# Patient Record
Sex: MALE | Race: WHITE | NOT HISPANIC OR LATINO | Employment: FULL TIME | URBAN - METROPOLITAN AREA
[De-identification: names, ages, dates, MRNs, and addresses within clinical notes are randomized per-mention and may not be internally consistent; named-entity substitution may affect disease eponyms.]

---

## 2017-06-22 ENCOUNTER — OFFICE VISIT (OUTPATIENT)
Dept: FAMILY MEDICINE | Facility: CLINIC | Age: 37
End: 2017-06-22
Payer: MEDICAID

## 2017-06-22 VITALS
SYSTOLIC BLOOD PRESSURE: 120 MMHG | BODY MASS INDEX: 26.33 KG/M2 | WEIGHT: 173.2 LBS | DIASTOLIC BLOOD PRESSURE: 80 MMHG | OXYGEN SATURATION: 96 % | TEMPERATURE: 99.1 F | HEART RATE: 78 BPM

## 2017-06-22 DIAGNOSIS — M77.00 GOLFER'S ELBOW, UNSPECIFIED LATERALITY: Primary | ICD-10-CM

## 2017-06-22 DIAGNOSIS — D17.1 BENIGN LIPOMATOUS NEOPLASM OF SKIN AND SUBCUTANEOUS TISSUE OF TRUNK: ICD-10-CM

## 2017-06-22 PROCEDURE — 99213 OFFICE O/P EST LOW 20 MIN: CPT | Performed by: INTERNAL MEDICINE

## 2017-06-22 ASSESSMENT — PAIN SCALES - GENERAL: PAINLEVEL: MILD PAIN (3)

## 2017-06-22 NOTE — MR AVS SNAPSHOT
After Visit Summary   6/22/2017    Rd Rodarte    MRN: [id number]           Patient Information     Date Of Birth          1980        Visit Information        Provider Department      6/22/2017 5:30 PM Sebastian Nj MD [facility]        Today's Diagnoses     Golfer's elbow, unspecified laterality    -  1    Benign lipomatous neoplasm of skin and subcutaneous tissue of trunk          Care Instructions    - Try a Counterforce brace and ice for elbow pain.    - Let me know if it gets any worse.          Follow-ups after your visit        Who to contact     If you have questions or need follow up information about today's clinic visit or your schedule please contact [facility] directly at [phone].  Normal or non-critical lab and imaging results will be communicated to you by [employer], letter or phone within 4 business days after the clinic has received the results. If you do not hear from us within 7 days, please contact the clinic through [employer] or phone. If you have a critical or abnormal lab result, we will notify you by phone as soon as possible.  Submit refill requests through "[employer]" or call your pharmacy and they will forward the refill request to us. Please allow 3 business days for your refill to be completed.          Additional Information About Your Visit        MyChart Information     "[employer]" gives you secure access to your electronic health record. If you see a primary care provider, you can also send messages to your care team and make appointments. If you have questions, please call your primary care clinic.  If you do not have a primary care provider, please call [phone] and they will assist you.        Care EveryWhere ID     This is your Care EveryWhere ID. This could be used by other organizations to access your [facility] medical records  [id number]        Your Vitals Were     Pulse Temperature Pulse Oximetry BMI (Body Mass Index)           78 99.1  F (37.3  C) (Oral) 96% 26.33 kg/m2         Blood Pressure from Last 3 Encounters:   06/22/17 120/80   01/12/15 124/78   08/28/14 122/88    Weight from Last 3 Encounters:   06/22/17 173 lb 3.2 oz (78.6 kg)   01/12/15 169 lb 12.8 oz (77 kg)   10/01/14 165 lb (74.8 kg)              Today, you had the following     No orders found for display       Primary Care Provider Office Phone # Fax #    Sebastian Nj [phone]       [address] [address]        Equal Access to Services     MOSES STORY : Hadii ricardo ku hadasho Soomaali, waaxda luqadaha, qaybta kaalmada adeegyada, waxselin barrera . So [facility] [phone].    ATENCIÓN: Si habla español, tiene a guzman disposición servicios gratuitos de asistencia lingüística. Llame al [phone].    We comply with applicable federal civil rights laws and [place] laws. We do not discriminate on the basis of race, color, national origin, age, disability sex, sexual orientation or gender identity.            Thank you!     Thank you for choosing [facility]  for your care. Our goal is always to provide you with excellent care. Hearing back from our patients is one way we can continue to improve our services. Please take a few minutes to complete the written survey that you may receive in the mail after your visit with us. Thank you!             Your Updated Medication List - Protect others around you: Learn how to safely use, store and throw away your medicines at www.disposemymeds.org.          This list is accurate as of: 6/22/17 11:17 PM.  Always use your most recent med list.                   Brand Name Dispense Instructions for use Diagnosis    COUMADIN 7.5 MG tablet   Generic drug:  warfarin      Take 1 tablet by mouth daily.        MULTIVITAMIN PO

## 2017-06-22 NOTE — PROGRESS NOTES
SUBJECTIVE:                                                    Rd Rodarte is a 36 year old male who presents to clinic today for the following health issues:       Golfer's elbow, unspecified laterality  Benign lipomatous neoplasm of skin and subcutaneous tissue of trunk     Mass -- Patient states his mass on his right elbow. He notes it has gotten smaller since he stopped using free weights although he's not certain of this. His elbows also have been mildly painful, about 2-3/10. He has family history of rheumatoid arthritis. He reports previously his bump was twice as big as now and and was somewhat erythematous.     Problem list and histories reviewed & adjusted, as indicated.  Additional history: as documented    Patient Active Problem List   Diagnosis     History of aortic valve repair in 1984, AVR in 1989 and 2nd AVR in 2012     Smoking     Chronic anticoagulation     Prophylactic antibiotic     Chest wall pain     Congestive heart failure (H)     CARDIOVASCULAR SCREENING; LDL GOAL LESS THAN 100     Anisocoria     Embolic stroke involving right cerebral artery, 1998     Closed Colles' fracture of left radius with malunion     Past Surgical History:   Procedure Laterality Date     REDO STERNOTOMY REPAIR VALVE AORTIC  1989    age of 8, had a medtronic-Patel 23 mm valve     REDO STERNOTOMY REPLACE VALVE AORTIC  12/27/2011    at [facility], 3rd time redo sternotomy     REPAIR VALVE AORTIC CHILD  1984    age of 3, first surgery for congenital bicuspid aortic valve     WRIST SURGERY  june 1999    ORIF of a fracture       Social History   Substance Use Topics     Smoking status: Former Smoker     Quit date: 12/1/2011     Smokeless tobacco: Never Used     Alcohol use Yes     Family History   Problem Relation Age of Onset     HEART DISEASE Father      2008     HEART DISEASE Maternal Grandfather          Current Outpatient Prescriptions   Medication Sig Dispense Refill     Multiple Vitamins-Minerals  (MULTIVITAMIN OR)        warfarin (COUMADIN) 7.5 MG tablet Take 1 tablet by mouth daily.       Labs reviewed in EPIC    Reviewed and updated as needed this visit by clinical staff  Tobacco  Allergies  Meds  Med Hx  Surg Hx  Fam Hx  Soc Hx      Reviewed and updated as needed this visit by Provider         ROS:  Constitutional, HEENT, cardiovascular, pulmonary, GI, , musculoskeletal, neuro, skin, endocrine and psych systems are negative, except as otherwise noted.    This document serves as a record of the services and decisions personally performed and made by Sebastian Nj MD. It was created on their behalf by Kerwin Zuniga, a trained medical scribe. The creation of this document is based the provider's statements to the medical scribe.  Kerwin Zuniga June 22, 2017 6:02 PM    OBJECTIVE:                                                    /80 (BP Location: Right arm, Cuff Size: Adult Regular)  Pulse 78  Temp 99.1  F (37.3  C) (Oral)  Wt 78.6 kg (173 lb 3.2 oz)  SpO2 96%  BMI 26.33 kg/m2  Body mass index is 26.33 kg/(m^2).  GENERAL: healthy, alert and no distress, talkative and appropriate   EYES: Eyes grossly normal to inspection, PERRL and conjunctivae and sclerae normal  ABDOMEN: soft, nontender, no hepatosplenomegaly, no masses and bowel sounds normal  MS: fleshy soft mass on right elbow, nothing bigger then an apricot pit, without tenderness to palpation it's along the extensor surface. Normal ROM of bilateral elbows, medial epicondyl tenderness, no lateral epicondyl tenderness  SKIN: no suspicious lesions or rashes to visible skin  NEURO: mentation intact and speech normal  PSYCH: mentation appears normal, affect normal/bright     ASSESSMENT/PLAN:                                                      (M77.00) Alex's elbow, unspecified laterality  (primary encounter diagnosis)  Comment: this is an entirely non-worrisome clinical presentation . His elbow soreness is totally consistent with  medial epicondylitis   Plan: we reviewed this in significant detail  With recommended to use a counter-force release brace and non-steroidal antiinflammatory drugs / ice / and review of stretching exercises . This is generally a self limited condition     (D17.1) Benign lipomatous neoplasm of skin and subcutaneous tissue of trunk  Comment: despite his history and the way he presents this, the exam is completely benign and no evidence whatsoever of rheumatoid nodules , gout occupational therapy tophi or subcutaneous cysts , this has all the earmarks of a lipoma  Plan: diagnosis explained , We discussed what to be on the watch for. No actions necessary at this point unless significant changes were to take place     We briefly reviewed other healthcare and patient is not interested to pursue other issues today . Follow up complete physical exam / general checkup is recommended within nothing more then 6 months     Patient Instructions   - Try a Counterforce brace and ice for elbow pain.    - Let me know if it gets any worse.    The information in this document, created by the medical scribe for me, accurately reflects the services I personally performed and the decisions made by me. I have reviewed and approved this document for accuracy.   MD Sebastian Shaver MD  [facility]

## 2017-06-22 NOTE — NURSING NOTE
"Chief Complaint   Patient presents with     Mass     bump on right elbow that grows in size after lifting weights x6 weeks - family history of arthritis.        Initial /80 (BP Location: Right arm, Cuff Size: Adult Regular)  Pulse 78  Temp 99.1  F (37.3  C) (Oral)  Wt 173 lb 3.2 oz (78.6 kg)  SpO2 96%  BMI 26.33 kg/m2 Estimated body mass index is 26.33 kg/(m^2) as calculated from the following:    Height as of 1/12/15: 5' 8\" (1.727 m).    Weight as of this encounter: 173 lb 3.2 oz (78.6 kg).  Medication Reconciliation: complete       Vicky Menezes CMA      "

## 2017-07-14 ENCOUNTER — TELEPHONE (OUTPATIENT)
Dept: FAMILY MEDICINE | Facility: CLINIC | Age: 37
End: 2017-07-14

## 2017-07-14 DIAGNOSIS — Z79.2 PROPHYLACTIC ANTIBIOTIC: Primary | ICD-10-CM

## 2017-07-14 RX ORDER — AMOXICILLIN 500 MG/1
2000 CAPSULE ORAL ONCE
Qty: 4 CAPSULE | Refills: 3 | Status: SHIPPED | OUTPATIENT
Start: 2017-07-14 | End: 2017-07-14

## 2017-07-14 NOTE — TELEPHONE ENCOUNTER
Reason for call:  Other   Patient called regarding (reason for call): appointment  Additional comments: Pt has dental appointment and needs antibiotic for appointment/contact for information.    Phone number to reach patient:  Cell number on file:    Telephone Information:   Mobile [phone]       Best Time:  anytime    Can we leave a detailed message on this number?  YES

## 2017-07-14 NOTE — TELEPHONE ENCOUNTER
Prescription generated and signed . Please ask patient if he is ok with us removing his penicillin allergy from allergy section of the Epic electronic medical records since he's taken penicillin without troubles since his purported reaction when he was 4 years old     Sebastian Nj MD

## 2017-07-14 NOTE — TELEPHONE ENCOUNTER
Major Sultana got the information of his preferred Pharmacy.   [id number] - [place], [place] - [address] AT [facility] [place]  [address]  [place] [address]  Phone: [phone] Fax: [phone]

## 2017-07-17 NOTE — TELEPHONE ENCOUNTER
Spoke with patient.  He stated that he has been fine taking amoxicillin but had a reaction when he was little and on PCN/amoxicillin for an extended period.  He does not want PCN/amoxicillin removed from his allergy list yet.  He will call back after trying the amoxicillin again tomorrow for his dental procedure    Candy De Leon RN

## 2018-11-14 ENCOUNTER — TRANSFERRED RECORDS (OUTPATIENT)
Dept: HEALTH INFORMATION MANAGEMENT | Facility: CLINIC | Age: 38
End: 2018-11-14

## 2020-02-16 ENCOUNTER — HEALTH MAINTENANCE LETTER (OUTPATIENT)
Age: 40
End: 2020-02-16

## 2020-09-01 ENCOUNTER — OFFICE VISIT (OUTPATIENT)
Dept: INTERNAL MEDICINE | Facility: CLINIC | Age: 40
End: 2020-09-01
Payer: COMMERCIAL

## 2020-09-01 VITALS
SYSTOLIC BLOOD PRESSURE: 106 MMHG | DIASTOLIC BLOOD PRESSURE: 74 MMHG | WEIGHT: 161 LBS | HEART RATE: 75 BPM | OXYGEN SATURATION: 99 % | RESPIRATION RATE: 16 BRPM | BODY MASS INDEX: 24.48 KG/M2 | TEMPERATURE: 97.9 F

## 2020-09-01 DIAGNOSIS — M54.12 CERVICAL RADICULOPATHY: ICD-10-CM

## 2020-09-01 DIAGNOSIS — Z86.79 HISTORY OF AORTIC VALVE REPAIR: ICD-10-CM

## 2020-09-01 DIAGNOSIS — Z13.29 SCREENING FOR HYPOTHYROIDISM: ICD-10-CM

## 2020-09-01 DIAGNOSIS — Z83.719 FAMILY HISTORY OF COLONIC POLYPS: ICD-10-CM

## 2020-09-01 DIAGNOSIS — Z00.00 ROUTINE HISTORY AND PHYSICAL EXAMINATION OF ADULT: Primary | ICD-10-CM

## 2020-09-01 DIAGNOSIS — Z23 NEED FOR PROPHYLACTIC VACCINATION AND INOCULATION AGAINST INFLUENZA: ICD-10-CM

## 2020-09-01 DIAGNOSIS — Z98.890 HISTORY OF AORTIC VALVE REPAIR: ICD-10-CM

## 2020-09-01 DIAGNOSIS — Z11.4 ENCOUNTER FOR SCREENING FOR HIV: ICD-10-CM

## 2020-09-01 DIAGNOSIS — I63.40: ICD-10-CM

## 2020-09-01 DIAGNOSIS — Z13.6 CARDIOVASCULAR SCREENING; LDL GOAL LESS THAN 100: ICD-10-CM

## 2020-09-01 LAB
ALT SERPL W P-5'-P-CCNC: 27 U/L (ref 0–70)
ANION GAP SERPL CALCULATED.3IONS-SCNC: 7 MMOL/L (ref 3–14)
BASOPHILS # BLD AUTO: 0 10E9/L (ref 0–0.2)
BASOPHILS NFR BLD AUTO: 0.2 %
BUN SERPL-MCNC: 12 MG/DL (ref 7–30)
CALCIUM SERPL-MCNC: 8.3 MG/DL (ref 8.5–10.1)
CHLORIDE SERPL-SCNC: 110 MMOL/L (ref 94–109)
CHOLEST SERPL-MCNC: 222 MG/DL
CO2 SERPL-SCNC: 24 MMOL/L (ref 20–32)
CREAT SERPL-MCNC: 0.74 MG/DL (ref 0.66–1.25)
DIFFERENTIAL METHOD BLD: NORMAL
EOSINOPHIL # BLD AUTO: 0.2 10E9/L (ref 0–0.7)
EOSINOPHIL NFR BLD AUTO: 4.6 %
ERYTHROCYTE [DISTWIDTH] IN BLOOD BY AUTOMATED COUNT: 13 % (ref 10–15)
GFR SERPL CREATININE-BSD FRML MDRD: >90 ML/MIN/{1.73_M2}
GLUCOSE SERPL-MCNC: 102 MG/DL (ref 70–99)
HCT VFR BLD AUTO: 46 % (ref 40–53)
HDLC SERPL-MCNC: 72 MG/DL
HGB BLD-MCNC: 15.6 G/DL (ref 13.3–17.7)
HIV 1+2 AB+HIV1 P24 AG SERPL QL IA: NONREACTIVE
LDLC SERPL CALC-MCNC: 137 MG/DL
LYMPHOCYTES # BLD AUTO: 1.1 10E9/L (ref 0.8–5.3)
LYMPHOCYTES NFR BLD AUTO: 23.7 %
MCH RBC QN AUTO: 31.5 PG (ref 26.5–33)
MCHC RBC AUTO-ENTMCNC: 33.9 G/DL (ref 31.5–36.5)
MCV RBC AUTO: 93 FL (ref 78–100)
MONOCYTES # BLD AUTO: 0.6 10E9/L (ref 0–1.3)
MONOCYTES NFR BLD AUTO: 12.1 %
NEUTROPHILS # BLD AUTO: 2.9 10E9/L (ref 1.6–8.3)
NEUTROPHILS NFR BLD AUTO: 59.4 %
NONHDLC SERPL-MCNC: 150 MG/DL
PLATELET # BLD AUTO: 164 10E9/L (ref 150–450)
POTASSIUM SERPL-SCNC: 3.8 MMOL/L (ref 3.4–5.3)
RBC # BLD AUTO: 4.96 10E12/L (ref 4.4–5.9)
SODIUM SERPL-SCNC: 141 MMOL/L (ref 133–144)
TRIGL SERPL-MCNC: 66 MG/DL
TSH SERPL DL<=0.005 MIU/L-ACNC: 1.14 MU/L (ref 0.4–4)
WBC # BLD AUTO: 4.8 10E9/L (ref 4–11)

## 2020-09-01 PROCEDURE — 90686 IIV4 VACC NO PRSV 0.5 ML IM: CPT | Performed by: INTERNAL MEDICINE

## 2020-09-01 PROCEDURE — 80061 LIPID PANEL: CPT | Performed by: INTERNAL MEDICINE

## 2020-09-01 PROCEDURE — 99213 OFFICE O/P EST LOW 20 MIN: CPT | Mod: 25 | Performed by: INTERNAL MEDICINE

## 2020-09-01 PROCEDURE — 80048 BASIC METABOLIC PNL TOTAL CA: CPT | Performed by: INTERNAL MEDICINE

## 2020-09-01 PROCEDURE — 84460 ALANINE AMINO (ALT) (SGPT): CPT | Performed by: INTERNAL MEDICINE

## 2020-09-01 PROCEDURE — 36415 COLL VENOUS BLD VENIPUNCTURE: CPT | Performed by: INTERNAL MEDICINE

## 2020-09-01 PROCEDURE — 99386 PREV VISIT NEW AGE 40-64: CPT | Mod: 25 | Performed by: INTERNAL MEDICINE

## 2020-09-01 PROCEDURE — 84443 ASSAY THYROID STIM HORMONE: CPT | Performed by: INTERNAL MEDICINE

## 2020-09-01 PROCEDURE — 87389 HIV-1 AG W/HIV-1&-2 AB AG IA: CPT | Performed by: INTERNAL MEDICINE

## 2020-09-01 PROCEDURE — 85025 COMPLETE CBC W/AUTO DIFF WBC: CPT | Performed by: INTERNAL MEDICINE

## 2020-09-01 PROCEDURE — 90471 IMMUNIZATION ADMIN: CPT | Performed by: INTERNAL MEDICINE

## 2020-09-01 ASSESSMENT — ENCOUNTER SYMPTOMS: NUMBNESS: 1

## 2020-09-01 NOTE — PATIENT INSTRUCTIONS
We reviewed multiple issues and concerns today     1. A colonoscopy is ordered because you have a family history of colon polyps in someone aged in their young 40's  2. A cervical spine MRI study is ordered due to your bilateral arm / hand symptoms which are suggestive of a pinched nerve in your neck. Because this is potentially serious  This study is recommended . Further follow up depending on the test results   3. You will get your repeat echocardiogram scheduled down the road, please let me know if you need any help getting this set up.  4. We ordered multiple laboratory studies , I will follow up with you with your results as soon as all of these tests are received     Sebastian Nj MD

## 2020-09-01 NOTE — LETTER
September 3, 2020      Rd Rodarte  [address]  [place] [address]        Dear ,    We are writing to inform you of your test results.    All of these tests are within acceptable limits , things look good !         Resulted Orders   TSH with free T4 reflex   Result Value Ref Range    TSH 1.14 0.40 - 4.00 mU/L   ALT   Result Value Ref Range    ALT 27 0 - 70 U/L   Basic metabolic panel   Result Value Ref Range    Sodium 141 133 - 144 mmol/L    Potassium 3.8 3.4 - 5.3 mmol/L    Chloride 110 (H) 94 - 109 mmol/L    Carbon Dioxide 24 20 - 32 mmol/L    Anion Gap 7 3 - 14 mmol/L    Glucose 102 (H) 70 - 99 mg/dL      Comment:      Fasting specimen    Urea Nitrogen 12 7 - 30 mg/dL    Creatinine 0.74 0.66 - 1.25 mg/dL    GFR Estimate >90 >60 mL/min/[1.73_m2]      Comment:      Non  GFR Calc  Starting 12/18/2018, serum creatinine based estimated GFR (eGFR) will be   calculated using the Chronic Kidney Disease Epidemiology Collaboration   (CKD-EPI) equation.      GFR Estimate If Black >90 >60 mL/min/[1.73_m2]      Comment:       GFR Calc  Starting 12/18/2018, serum creatinine based estimated GFR (eGFR) will be   calculated using the Chronic Kidney Disease Epidemiology Collaboration   (CKD-EPI) equation.      Calcium 8.3 (L) 8.5 - 10.1 mg/dL   CBC with platelets differential   Result Value Ref Range    WBC 4.8 4.0 - 11.0 10e9/L    RBC Count 4.96 4.4 - 5.9 10e12/L    Hemoglobin 15.6 13.3 - 17.7 g/dL    Hematocrit 46.0 40.0 - 53.0 %    MCV 93 78 - 100 fl    MCH 31.5 26.5 - 33.0 pg    MCHC 33.9 31.5 - 36.5 g/dL    RDW 13.0 10.0 - 15.0 %    Platelet Count 164 150 - 450 10e9/L    % Neutrophils 59.4 %    % Lymphocytes 23.7 %    % Monocytes 12.1 %    % Eosinophils 4.6 %    % Basophils 0.2 %    Absolute Neutrophil 2.9 1.6 - 8.3 10e9/L    Absolute Lymphocytes 1.1 0.8 - 5.3 10e9/L    Absolute Monocytes 0.6 0.0 - 1.3 10e9/L    Absolute Eosinophils 0.2 0.0 - 0.7 10e9/L     Absolute Basophils 0.0 0.0 - 0.2 10e9/L    Diff Method Automated Method    Lipid panel reflex to direct LDL Fasting   Result Value Ref Range    Cholesterol 222 (H) <200 mg/dL      Comment:      Desirable:       <200 mg/dl    Triglycerides 66 <150 mg/dL      Comment:      Fasting specimen    HDL Cholesterol 72 >39 mg/dL    LDL Cholesterol Calculated 137 (H) <100 mg/dL      Comment:      Above desirable:  100-129 mg/dl  Borderline High:  130-159 mg/dL  High:             160-189 mg/dL  Very high:       >189 mg/dl      Non HDL Cholesterol 150 (H) <130 mg/dL      Comment:      Above Desirable:  130-159 mg/dl  Borderline high:  160-189 mg/dl  High:             190-219 mg/dl  Very high:       >219 mg/dl     HIV Antigen Antibody Combo   Result Value Ref Range    HIV Antigen Antibody Combo Nonreactive NR^Nonreactive          Comment:      HIV-1 p24 Ag & HIV-1/HIV-2 Ab Not Detected       If you have any questions or concerns, please call the clinic at the number listed above.       Sincerely,        Sebastian Nj MD/aura

## 2020-09-08 ENCOUNTER — TELEPHONE (OUTPATIENT)
Dept: FAMILY MEDICINE | Facility: CLINIC | Age: 40
End: 2020-09-08

## 2020-09-08 DIAGNOSIS — F40.240 CLAUSTROPHOBIA: Primary | ICD-10-CM

## 2020-09-08 RX ORDER — LORAZEPAM 0.5 MG/1
0.5 TABLET ORAL ONCE
Qty: 1 TABLET | Refills: 0 | Status: SHIPPED | OUTPATIENT
Start: 2020-09-08 | End: 2020-09-08

## 2020-09-08 NOTE — TELEPHONE ENCOUNTER
Patient is having anxiety about MRI scheduled tomorrow and would like something to take prior to the MRI.   Alee Starr RN

## 2020-09-08 NOTE — TELEPHONE ENCOUNTER
Reason for Call:  Other medication     Detailed comments: Patient wants to know if he can get  a sensitive medication due to anxiety. Patient has a MRI scheduled for tomorrow 9/9/20 at 820AM.        Phone Number Patient can be reached at: Cell number on file:    Telephone Information:   Mobile [phone]       Best Time: any    Can we leave a detailed message on this number? YES    Call taken on 9/8/2020 at 11:37 AM by Mundo Wolfe

## 2020-09-09 ENCOUNTER — ANCILLARY PROCEDURE (OUTPATIENT)
Dept: MRI IMAGING | Facility: CLINIC | Age: 40
End: 2020-09-09
Attending: INTERNAL MEDICINE
Payer: COMMERCIAL

## 2020-09-09 DIAGNOSIS — M54.12 CERVICAL RADICULOPATHY: ICD-10-CM

## 2020-09-09 PROCEDURE — 72141 MRI NECK SPINE W/O DYE: CPT | Mod: TC

## 2020-09-25 ENCOUNTER — OFFICE VISIT (OUTPATIENT)
Dept: URGENT CARE | Facility: URGENT CARE | Age: 40
End: 2020-09-25
Payer: COMMERCIAL

## 2020-09-25 VITALS
HEART RATE: 78 BPM | TEMPERATURE: 98.1 F | SYSTOLIC BLOOD PRESSURE: 112 MMHG | DIASTOLIC BLOOD PRESSURE: 70 MMHG | OXYGEN SATURATION: 98 %

## 2020-09-25 DIAGNOSIS — H10.9 BACTERIAL CONJUNCTIVITIS OF RIGHT EYE: ICD-10-CM

## 2020-09-25 DIAGNOSIS — H66.001 NON-RECURRENT ACUTE SUPPURATIVE OTITIS MEDIA OF RIGHT EAR WITHOUT SPONTANEOUS RUPTURE OF TYMPANIC MEMBRANE: Primary | ICD-10-CM

## 2020-09-25 PROCEDURE — 99214 OFFICE O/P EST MOD 30 MIN: CPT | Performed by: NURSE PRACTITIONER

## 2020-09-25 RX ORDER — POLYMYXIN B SULFATE AND TRIMETHOPRIM 1; 10000 MG/ML; [USP'U]/ML
1-2 SOLUTION OPHTHALMIC EVERY 4 HOURS
Qty: 5 ML | Refills: 0 | Status: SHIPPED | OUTPATIENT
Start: 2020-09-25 | End: 2020-10-02

## 2020-09-25 RX ORDER — CEFDINIR 300 MG/1
300 CAPSULE ORAL 2 TIMES DAILY
Qty: 20 CAPSULE | Refills: 0 | Status: SHIPPED | OUTPATIENT
Start: 2020-09-25 | End: 2020-10-05

## 2020-09-25 RX ORDER — DOXYCYCLINE HYCLATE 100 MG
100 TABLET ORAL 2 TIMES DAILY
Qty: 14 TABLET | Refills: 0 | Status: CANCELLED | OUTPATIENT
Start: 2020-09-25 | End: 2020-10-02

## 2020-09-25 ASSESSMENT — ENCOUNTER SYMPTOMS
SHORTNESS OF BREATH: 0
FEVER: 0
EYE PAIN: 1
NAUSEA: 0
VOMITING: 0
CHILLS: 0
RHINORRHEA: 0
DIARRHEA: 0
SORE THROAT: 0
COUGH: 0
EYE DISCHARGE: 1
EYE REDNESS: 1
DIAPHORESIS: 0

## 2020-09-25 NOTE — PATIENT INSTRUCTIONS
Patient Education     Bacterial Conjunctivitis    You have an infection in the membranes covering the white part of the eye. This part of the eye is called the conjunctiva. The infection is called conjunctivitis. The most common symptoms of conjunctivitis include a thick, pus-like discharge from the eye, swollen eyelids, redness, eyelids sticking together upon awakening, and a gritty or scratchy feeling in the eye. Your infection was caused by bacteria. It may be treated with medicine. With treatment, the infection takes about 7 to 10 days to resolve.  Home care    Use prescribed antibiotic eye drops or ointment as directed to treat the infection.    Apply a warm compress (towel soaked in warm water) to the affected eye 3 to 4 times a day. Do this just before applying medicine to the eye.    Use a warm, wet cloth to wipe away crusting of the eyelids in the morning. This is caused by mucus drainage during the night. You may also use saline irrigating solution or artificial tears to rinse away mucus in the eye. Do not put a patch over the eye.    Wash your hands before and after touching the infected eye. This is to prevent spreading the infection to the other eye, and to other people. Don't share your towels or washcloths with others.    You may use acetaminophen or ibuprofen to control pain, unless another medicine was prescribed. (Note: If you have chronic liver or kidney disease or have ever had a stomach ulcer or gastrointestinal bleeding, talk with your doctor before using these medicines.)    Don't wear contact lenses until your eyes have healed and all symptoms are gone.  Follow-up care  Follow up with your healthcare provider, or as advised.  When to seek medical advice  Call your healthcare provider right away if any of these occur:    Worsening vision    Increasing pain in the eye    Increasing swelling or redness of the eyelid    Redness spreading around the eye  Date Last Reviewed: 7/1/2017 2000-2019  [employer]. [address] [place] [address], [place] [address]. All rights reserved. This information is not intended as a substitute for professional medical care. Always follow your healthcare professional's instructions.           Patient Education     Middle Ear Infection (Otitis Media) in Adults  What is a middle ear infection?  A middle ear infection occurs behind the eardrum. It is most often caused by a virus or bacteria. Most kids have at least one middle ear infection by the time they are 3 years old. But adults can also get them.  What causes middle ear infections?  Inflammation in the middle ear most often starts after you ve had a sore throat, cold, or other upper respiratory problem. The infection spreads to the middle ear and causes fluid buildup behind the eardrum.   What are the symptoms of a middle ear infection?  These are the most common symptoms of middle ear infections in adults:    Ear pain    Feeling of fullness in the hear    Fluid draining from the ear    Fever    Hearing loss  These symptoms may look like other conditions or health problems. Always talk with your healthcare provider for a diagnosis.  How is a middle ear infection diagnosed?  Your healthcare provider will review your health history and do a physical exam. He or she will check the outer ear and the eardrum using an otoscope. The otoscope is a lighted tool that lets the healthcare provider see inside the ear. A pneumatic otoscope blows a puff of air into the ear to test eardrum movement. When there is fluid or infection in the middle ear, movement is decreased.  Your provider may also do a tympanometry. This is a test that directs air and sound to the middle ear.  If you have ear infections often, your healthcare provider may suggest having a hearing test.  How is a middle ear infection treated?  Treatment will depend on your symptoms, age, and general health. It will also depend on how severe the condition  is.  Treatment may include:    Antibiotics    Pain relievers    Placing small tubes in the eardrum for chronic ear infections   What are possible complications of a middle ear infection?  Untreated ear infections can lead to:    Infection in other parts of the head    Lasting (permanent) hearing loss    Speech and language problems  Can middle ear infections be prevented?  Cold and allergy medicines don't seem to prevent ear infections. And currently there is no vaccine that can prevent the disease. But check with your healthcare provider and make sure your vaccines are up-to-date. Living in a home where cigarettes are smoked can increase the chances of ear infections.  Key points about middle ear infections    Middle ear infections can affect both children and adults.    Pain and fever can be the most common symptoms.    Without treatment, permanent hearing loss may happen.    Take antibiotics as prescribed and finish all of the prescription. This can help prevent antibiotic-resistant infections or incomplete treatment with the infection returning.    Next steps  Tips to help you get the most from a visit to your healthcare provider:    Know the reason for your visit and what you want to happen.    Before your visit, write down questions you want answered.    Bring someone with you to help you ask questions and remember what your provider tells you.    At the visit, write down the name of a new diagnosis, and any new medicines, treatments, or tests. Also write down any new instructions your provider gives you.    Know why a new medicine or treatment is prescribed, and how it will help you. Also know what the side effects are.    Ask if your condition can be treated in other ways.    Know why a test or procedure is recommended and what the results could mean.    Know what to expect if you do not take the medicine or have the test or procedure.    If you have a follow-up appointment, write down the date, time, and  purpose for that visit.    Know how you can contact your provider if you have questions.      8260-5223 [employer]. [address] [facility], [place] [address]. All rights reserved. This information is not intended as a substitute for professional medical care. Always follow your healthcare professional's instructions.

## 2020-09-25 NOTE — PROGRESS NOTES
SUBJECTIVE:   Rd Rodarte is a 40 year old male presenting with a chief complaint of   Chief Complaint   Patient presents with     Nasal Congestion     bilateral ear pain, nasal congestion and right eye mattering for the past week       He is an established patient of [facility].    Both ear pain    Onset of symptoms was 7 day(s) ago.  Course of illness is worsening.    Severity moderate  Current and Associated symptoms: ear pain bilateral  Treatment measures tried include None tried.  Predisposing factors include None.    Eye Problem    Onset of symptoms was 2 day(s) ago.   Location: right eye   Course of illness is worsening.    Severity moderate  Current and Associated symptoms: discharge, mattering, pain, redness  Treatment measures tried include none  Context: none    Review of Systems   Constitutional: Negative for chills, diaphoresis and fever.   HENT: Positive for ear pain. Negative for congestion, rhinorrhea and sore throat.    Eyes: Positive for pain, discharge and redness.   Respiratory: Negative for cough and shortness of breath.    Gastrointestinal: Negative for diarrhea, nausea and vomiting.   All other systems reviewed and are negative.      Past Medical History:   Diagnosis Date     Chest wall pain      Chronic anticoagulation      Congestive heart failure, unspecified 2011    heart attack-      History of aortic valve repair      Myocardial infarction (H)     of left ventricle, due to ruptured / failed AVR     Prophylactic antibiotic      Smoking      Unspecified cerebral artery occlusion with cerebral infarction Feb 1998    this was an embolic event associated with LUE and Left Hand weakness     Family History   Problem Relation Age of Onset     Heart Disease Father         2008     Heart Disease Maternal Grandfather      Current Outpatient Medications   Medication Sig Dispense Refill     cefdinir (OMNICEF) 300 MG capsule Take 1 capsule (300 mg) by mouth 2 times daily for 10 days 20 capsule 0      Multiple Vitamins-Minerals (MULTIVITAMIN OR)        trimethoprim-polymyxin b (POLYTRIM) 01593-2.1 UNIT/ML-% ophthalmic solution Place 1-2 drops into the right eye every 4 hours for 7 days 5 mL 0     warfarin (COUMADIN) 7.5 MG tablet Take 1 tablet by mouth daily.       Social History     Tobacco Use     Smoking status: Former Smoker     Last attempt to quit: 2011     Years since quittin.8     Smokeless tobacco: Never Used   Substance Use Topics     Alcohol use: Yes       OBJECTIVE  /70   Pulse 78   Temp 98.1  F (36.7  C) (Tympanic)   SpO2 98%     Physical Exam  Vitals signs and nursing note reviewed.   Constitutional:       General: He is not in acute distress.     Appearance: He is well-developed. He is not diaphoretic.   HENT:      Head: Normocephalic and atraumatic.      Right Ear: External ear normal. Tympanic membrane is erythematous and bulging.      Left Ear: Tympanic membrane and external ear normal.   Eyes:      General:         Right eye: Discharge (yellow) present.      Conjunctiva/sclera:      Right eye: Right conjunctiva is injected.      Pupils: Pupils are equal, round, and reactive to light.   Neck:      Musculoskeletal: Normal range of motion and neck supple.   Pulmonary:      Effort: Pulmonary effort is normal. No respiratory distress.      Breath sounds: Normal breath sounds.   Lymphadenopathy:      Cervical: No cervical adenopathy.   Skin:     General: Skin is warm and dry.   Neurological:      Mental Status: He is alert.      Cranial Nerves: No cranial nerve deficit.           ASSESSMENT:      ICD-10-CM    1. Non-recurrent acute suppurative otitis media of right ear without spontaneous rupture of tympanic membrane  H66.001 cefdinir (OMNICEF) 300 MG capsule   2. Bacterial conjunctivitis of right eye  H10.9 trimethoprim-polymyxin b (POLYTRIM) 14271-2.1 UNIT/ML-% ophthalmic solution        Medical Decision Making:    Differential Diagnosis:    Serious Comorbid Conditions:  Adult:   Anticoagulation    PLAN:  Antibiotics as prescribed.  Recheck ear in 2 weeks.  Advised patient to use warm compresses to keep the eyelids clean. To keep the bacteria from spreading, wash  hands often. Use a separate tissue to wipe each eye. Don t touch eyes or share bedding or towels.  Patient educational/instructional material provided including reasons for follow-up    The patient indicates understanding of these issues and agrees with the plan.          Patient Instructions     Patient Education     Bacterial Conjunctivitis    You have an infection in the membranes covering the white part of the eye. This part of the eye is called the conjunctiva. The infection is called conjunctivitis. The most common symptoms of conjunctivitis include a thick, pus-like discharge from the eye, swollen eyelids, redness, eyelids sticking together upon awakening, and a gritty or scratchy feeling in the eye. Your infection was caused by bacteria. It may be treated with medicine. With treatment, the infection takes about 7 to 10 days to resolve.  Home care    Use prescribed antibiotic eye drops or ointment as directed to treat the infection.    Apply a warm compress (towel soaked in warm water) to the affected eye 3 to 4 times a day. Do this just before applying medicine to the eye.    Use a warm, wet cloth to wipe away crusting of the eyelids in the morning. This is caused by mucus drainage during the night. You may also use saline irrigating solution or artificial tears to rinse away mucus in the eye. Do not put a patch over the eye.    Wash your hands before and after touching the infected eye. This is to prevent spreading the infection to the other eye, and to other people. Don't share your towels or washcloths with others.    You may use acetaminophen or ibuprofen to control pain, unless another medicine was prescribed. (Note: If you have chronic liver or kidney disease or have ever had a stomach ulcer or gastrointestinal  bleeding, talk with your doctor before using these medicines.)    Don't wear contact lenses until your eyes have healed and all symptoms are gone.  Follow-up care  Follow up with your healthcare provider, or as advised.  When to seek medical advice  Call your healthcare provider right away if any of these occur:    Worsening vision    Increasing pain in the eye    Increasing swelling or redness of the eyelid    Redness spreading around the eye  Date Last Reviewed: 7/1/2017 2000-2019 [employer]. [address] [place] [address]. All rights reserved. This information is not intended as a substitute for professional medical care. Always follow your healthcare professional's instructions.           Patient Education     Middle Ear Infection (Otitis Media) in Adults  What is a middle ear infection?  A middle ear infection occurs behind the eardrum. It is most often caused by a virus or bacteria. Most kids have at least one middle ear infection by the time they are 3 years old. But adults can also get them.  What causes middle ear infections?  Inflammation in the middle ear most often starts after you ve had a sore throat, cold, or other upper respiratory problem. The infection spreads to the middle ear and causes fluid buildup behind the eardrum.   What are the symptoms of a middle ear infection?  These are the most common symptoms of middle ear infections in adults:    Ear pain    Feeling of fullness in the hear    Fluid draining from the ear    Fever    Hearing loss  These symptoms may look like other conditions or health problems. Always talk with your healthcare provider for a diagnosis.  How is a middle ear infection diagnosed?  Your healthcare provider will review your health history and do a physical exam. He or she will check the outer ear and the eardrum using an otoscope. The otoscope is a lighted tool that lets the healthcare provider see inside the ear. A pneumatic otoscope blows a  puff of air into the ear to test eardrum movement. When there is fluid or infection in the middle ear, movement is decreased.  Your provider may also do a tympanometry. This is a test that directs air and sound to the middle ear.  If you have ear infections often, your healthcare provider may suggest having a hearing test.  How is a middle ear infection treated?  Treatment will depend on your symptoms, age, and general health. It will also depend on how severe the condition is.  Treatment may include:    Antibiotics    Pain relievers    Placing small tubes in the eardrum for chronic ear infections   What are possible complications of a middle ear infection?  Untreated ear infections can lead to:    Infection in other parts of the head    Lasting (permanent) hearing loss    Speech and language problems  Can middle ear infections be prevented?  Cold and allergy medicines don't seem to prevent ear infections. And currently there is no vaccine that can prevent the disease. But check with your healthcare provider and make sure your vaccines are up-to-date. Living in a home where cigarettes are smoked can increase the chances of ear infections.  Key points about middle ear infections    Middle ear infections can affect both children and adults.    Pain and fever can be the most common symptoms.    Without treatment, permanent hearing loss may happen.    Take antibiotics as prescribed and finish all of the prescription. This can help prevent antibiotic-resistant infections or incomplete treatment with the infection returning.    Next steps  Tips to help you get the most from a visit to your healthcare provider:    Know the reason for your visit and what you want to happen.    Before your visit, write down questions you want answered.    Bring someone with you to help you ask questions and remember what your provider tells you.    At the visit, write down the name of a new diagnosis, and any new medicines, treatments, or  tests. Also write down any new instructions your provider gives you.    Know why a new medicine or treatment is prescribed, and how it will help you. Also know what the side effects are.    Ask if your condition can be treated in other ways.    Know why a test or procedure is recommended and what the results could mean.    Know what to expect if you do not take the medicine or have the test or procedure.    If you have a follow-up appointment, write down the date, time, and purpose for that visit.    Know how you can contact your provider if you have questions.      5261-7223 [employer]. [address] [place] [address]. All rights reserved. This information is not intended as a substitute for professional medical care. Always follow your healthcare professional's instructions.

## 2020-11-24 ENCOUNTER — OFFICE VISIT (OUTPATIENT)
Dept: URGENT CARE | Facility: URGENT CARE | Age: 40
End: 2020-11-24
Payer: COMMERCIAL

## 2020-11-24 VITALS
BODY MASS INDEX: 23.57 KG/M2 | DIASTOLIC BLOOD PRESSURE: 78 MMHG | HEART RATE: 88 BPM | RESPIRATION RATE: 16 BRPM | OXYGEN SATURATION: 99 % | SYSTOLIC BLOOD PRESSURE: 112 MMHG | TEMPERATURE: 98.5 F | WEIGHT: 155 LBS

## 2020-11-24 DIAGNOSIS — H10.33 ACUTE CONJUNCTIVITIS OF BOTH EYES, UNSPECIFIED ACUTE CONJUNCTIVITIS TYPE: Primary | ICD-10-CM

## 2020-11-24 DIAGNOSIS — H04.203 BILATERAL EPIPHORA: ICD-10-CM

## 2020-11-24 DIAGNOSIS — Z79.01 ANTICOAGULATION ADEQUATE WITH ANTICOAGULANT THERAPY: ICD-10-CM

## 2020-11-24 PROCEDURE — 99214 OFFICE O/P EST MOD 30 MIN: CPT | Performed by: PHYSICIAN ASSISTANT

## 2020-11-24 RX ORDER — OFLOXACIN 3 MG/ML
1-2 SOLUTION/ DROPS OPHTHALMIC 4 TIMES DAILY
Qty: 1 BOTTLE | Refills: 0 | Status: SHIPPED | OUTPATIENT
Start: 2020-11-24 | End: 2020-12-02

## 2020-11-24 NOTE — PROGRESS NOTES
VISION   No corrective lenses  Tool used: Garcia   Right eye:        10/16 (20/32)   Left eye:          10/12.5 (20/25)  Visual Acuity: Pass     Right Ear Wash complete    TANIKA Jensen CMA ([facility])

## 2020-11-24 NOTE — PROGRESS NOTES
S: 41 yo male is here for R eye redness and watering x 4 days.Some yellow crusting in the AM. No pain just itching. Now also left eye today. Also had 2020- seen in  treated for ROM and conjunctivitis. Symptoms resolved with antibiotic eye drop and oral antibiotic for the ear. Decreased vision because eye is watering. No fever, congestion, cough, ST. No contacts or glasses. Has never seen an eye doctor.  No Covid exposure. No covid symptoms.  PMHX- r cerebral artery embolic stroke . S/P AVR repair, on coumadin. INR 10/1 3.2    Allergies   Allergen Reactions     Amoxicillin      Swelling - this reaction was at age 4. He was given amoxicillin as a prophylactic antibiotic before open heart surgery without a reaction in the  and         Ceccurry Cd [Cefaclor Monohydrate]      Rash       Erythromycin      Rash       Penicillins      Swelling         Past Medical History:   Diagnosis Date     Chest wall pain      Chronic anticoagulation      Congestive heart failure, unspecified     heart attack-      History of aortic valve repair      Myocardial infarction (H)     of left ventricle, due to ruptured / failed AVR     Prophylactic antibiotic      Smoking      Unspecified cerebral artery occlusion with cerebral infarction 1998    this was an embolic event associated with LUE and Left Hand weakness            Multiple Vitamins-Minerals (MULTIVITAMIN OR),        warfarin (COUMADIN) 7.5 MG tablet, Take 1 tablet by mouth daily.    No current facility-administered medications on file prior to visit.       Social History     Tobacco Use     Smoking status: Former Smoker     Quit date: 2011     Years since quittin.9     Smokeless tobacco: Never Used   Substance Use Topics     Alcohol use: Yes       ROS:  CONSTITUTIONAL: Negative for fatigue or fever.  EYES: as above.  ENT: neg for cough.  RESP: neg for SOB.  CV: Negative for chest pains.  GI: Negative for vomiting.  MUSCULOSKELETAL:  Negative for  significant muscle or joint pains.  NEUROLOGIC: Negative for headaches.  SKIN: Negative for rash.  PSYCH: Normal mentation for age.    OBJECTIVE:  /78   Pulse 88   Temp 98.5  F (36.9  C) (Oral)   Resp 16   Wt 70.3 kg (155 lb)   SpO2 99%   BMI 23.57 kg/m    GENERAL APPEARANCE: Healthy, alert and no distress.  EYES: PERRLA. EOM's intact without pain. Fundoscopic exam grossly benign bilaterally.Conjunctiva/sclera with mild injectio R> L.   EARS: R canal- lots of cerumen. L TM- 50% visualized, translucent.  After ear wash R TM appears translucent.  NOSE/MOUTH: Nose without ulcers, erythema or lesions.  SINUSES: No maxillary sinus tenderness.  THROAT: No erythema w/o tonsillar enlargement . No exudates.  NECK: Supple, nontender, no lymphadenopathy.  RESP: Lungs clear to auscultation - no rales, rhonchi or wheezes  CV: Regular rate and rhythm, normal S1 S2, no murmur noted.  NEURO: Awake, alert    SKIN: No rashes    Vision  R 20/32  L 20/25    ASSESSMENT:       ICD-10-CM    1. Acute conjunctivitis of both eyes, unspecified acute conjunctivitis type  H10.33 ofloxacin (OCUFLOX) 0.3 % ophthalmic solution     EYE ADULT REFERRAL   2. Bilateral epiphora  H04.203    3. Anticoagulation adequate with anticoagulant therapy  Z79.01 EYE ADULT REFERRAL         PLAN:Continue to monitor INR. See Eye doctor. Never has had an eye exam. Needs good exam of retina and eye pressures checked.  I have discussed clinical findings with patient.  Side effects of medications discussed.  Symptomatic care is discussed.  I have discussed the possibility of  worsening symptoms and indication to RTC or go to the ER if they occur.  All questions are answered, patient indicates understanding of these issues and is in agreement with plan.   Patient care instructions are discussed/given at the end of visit.   Lots of rest and fluids.    Maria Guadalupe Crystal PA-C

## 2021-01-06 ENCOUNTER — TELEPHONE (OUTPATIENT)
Dept: FAMILY MEDICINE | Facility: CLINIC | Age: 41
End: 2021-01-06

## 2021-01-06 DIAGNOSIS — F40.240 CLAUSTROPHOBIA: Primary | ICD-10-CM

## 2021-01-06 RX ORDER — LORAZEPAM 0.5 MG/1
0.5 TABLET ORAL
Qty: 2 TABLET | Refills: 0 | Status: SHIPPED | OUTPATIENT
Start: 2021-01-06 | End: 2021-02-25

## 2021-01-06 NOTE — TELEPHONE ENCOUNTER
Reason for Call:  Medication or medication refill:    Do you use a [facility]?  Name of the pharmacy and phone number for the current request:  [id number] - [place], [place] - [address] AT [facility] [address]    Name of the medication requested: Something for claustrophobia for MRI appt 01/07 @ 10:00.    Other request: none    Can we leave a detailed message on this number? YES    Phone number patient can be reached at: Cell number on file:    Telephone Information:   Mobile [phone]       Best Time: any    Call taken on 1/6/2021 at 2:25 PM by Tawnya Laguerre

## 2021-01-27 ENCOUNTER — OFFICE VISIT (OUTPATIENT)
Dept: OPTOMETRY | Facility: CLINIC | Age: 41
End: 2021-01-27
Payer: COMMERCIAL

## 2021-01-27 DIAGNOSIS — H52.223 REGULAR ASTIGMATISM OF BOTH EYES: ICD-10-CM

## 2021-01-27 DIAGNOSIS — H04.123 DRY EYES: Primary | ICD-10-CM

## 2021-01-27 DIAGNOSIS — H57.02 ANISOCORIA: ICD-10-CM

## 2021-01-27 DIAGNOSIS — I63.40: ICD-10-CM

## 2021-01-27 PROCEDURE — 92004 COMPRE OPH EXAM NEW PT 1/>: CPT | Performed by: OPTOMETRIST

## 2021-01-27 PROCEDURE — 92015 DETERMINE REFRACTIVE STATE: CPT | Performed by: OPTOMETRIST

## 2021-01-27 ASSESSMENT — VISUAL ACUITY
OD_SC+: -1
OD_SC: 20/25
OS_SC: 20/25
OS_SC: 20/30
METHOD: SNELLEN - LINEAR
OS_SC+: -3
OD_SC: 20/20

## 2021-01-27 ASSESSMENT — CONF VISUAL FIELD
METHOD: COUNTING FINGERS
OD_NORMAL: 1
OS_NORMAL: 1

## 2021-01-27 ASSESSMENT — SLIT LAMP EXAM - LIDS
COMMENTS: NORMAL
COMMENTS: NORMAL

## 2021-01-27 ASSESSMENT — KERATOMETRY
OD_AXISANGLE2_DEGREES: 178
OD_K2POWER_DIOPTERS: 42.00
OS_K2POWER_DIOPTERS: 42.25
OS_AXISANGLE2_DEGREES: 179
OD_K1POWER_DIOPTERS: 42.75
OS_K1POWER_DIOPTERS: 43.00

## 2021-01-27 ASSESSMENT — REFRACTION_MANIFEST
OS_CYLINDER: +1.25
OD_AXIS: 175
OS_CYLINDER: +1.50
OD_SPHERE: -0.75
METHOD_AUTOREFRACTION: 1
OD_AXIS: 176
OD_CYLINDER: +1.25
OS_SPHERE: -1.50
OS_SPHERE: -1.00
OS_AXIS: 005
OS_AXIS: 180
OD_SPHERE: -0.50
OD_CYLINDER: +1.25

## 2021-01-27 ASSESSMENT — TONOMETRY
OS_IOP_MMHG: 18
IOP_METHOD: APPLANATION
OD_IOP_MMHG: 18

## 2021-01-27 ASSESSMENT — EXTERNAL EXAM - RIGHT EYE: OD_EXAM: NORMAL

## 2021-01-27 ASSESSMENT — CUP TO DISC RATIO
OD_RATIO: 0.5
OS_RATIO: 0.5

## 2021-01-27 ASSESSMENT — EXTERNAL EXAM - LEFT EYE: OS_EXAM: NORMAL

## 2021-01-27 NOTE — PROGRESS NOTES
"Chief Complaint   Patient presents with     COMPREHENSIVE EYE EXAM         Last Eye Exam: Doesn't think he's ever had an actual eye exam   Dilated Previously: No, side effects of dilation explained today    What are you currently using to see?  does not use glasses or contacts. Thinks that it might be \"time\"        Distance Vision Acuity: Satisfied with vision in the daylight, after dark vision seems to be limited - headlights look starey, street signd hard to see at night     Near Vision Acuity: Satisfied with vision while reading and using computer unaided    Eye Comfort: dry and strained by days end , watery eye started in Sept 2020, antibiotic did not resolve it   Do you use eye drops? : Yes: Uses OTC, Visine - no sting or whatever just for comfort   Occupation or Hobbies: [employer]  - works in [employer]           Medical, surgical and family histories reviewed and updated     Patient reports mechanical heart valve malfunctioned 1998 causing a heart attack, right frontal lobe stroke, left sided weakness, anisocoria     OBJECTIVE: See Ophthalmology exam    ASSESSMENT:    ICD-10-CM    1. Dry eyes  H04.123 EYE EXAM (SIMPLE-NONBILLABLE)     REFRACTION   2. Regular astigmatism of both eyes  H52.223 EYE EXAM (SIMPLE-NONBILLABLE)     REFRACTION   3. Anisocoria  H57.02 EYE EXAM (SIMPLE-NONBILLABLE)     REFRACTION   4. Embolic stroke involving right cerebral artery, 1998  I63.40 EYE EXAM (SIMPLE-NONBILLABLE)     REFRACTION      PLAN:     Patient Instructions   Fill glasses prescription  Allow 2 weeks to adapt to change in glasses  Return to clinic to verify glasses and for glasses check as needed.   Use over the counter artificial tears 2-4  times a day (Thera Tears , Thera Tears Extra, Systane Complete or Refresh Optive)  Return in 1 year for eye exam    Karma Waterman, OD  [phone]                   "

## 2021-01-27 NOTE — PATIENT INSTRUCTIONS
Fill glasses prescription  Allow 2 weeks to adapt to change in glasses  Return to clinic to verify glasses and for glasses check as needed.   Use over the counter artificial tears 2-4  times a day (Thera Tears , Thera Tears Extra, Systane Complete or Refresh Optive)  Return in 1 year for eye exam    Karma Waterman, OD  [phone]

## 2021-01-27 NOTE — LETTER
"    1/27/2021         RE: Rd Rodarte  [address] [place] [address]        Dear Colleague,    Thank you for referring your patient, Rd Rodarte, to the [facility]. Please see a copy of my visit note below.    Chief Complaint   Patient presents with     COMPREHENSIVE EYE EXAM         Last Eye Exam: Doesn't think he's ever had an actual eye exam   Dilated Previously: No, side effects of dilation explained today    What are you currently using to see?  does not use glasses or contacts. Thinks that it might be \"time\"        Distance Vision Acuity: Satisfied with vision in the daylight, after dark vision seems to be limited - headlights look starey, street signd hard to see at night     Near Vision Acuity: Satisfied with vision while reading and using computer unaided    Eye Comfort: dry and strained by days end , watery eye started in Sept 2020, antibiotic did not resolve it   Do you use eye drops? : Yes: Uses OTC, Visine - no sting or whatever just for comfort   Occupation or Hobbies: [employer]  - works in [email or web address]           Medical, surgical and family histories reviewed and updated     Patient reports mechanical heart valve malfunctioned 1998 causing a heart attack, right frontal lobe stroke, left sided weakness, anisocoria     OBJECTIVE: See Ophthalmology exam    ASSESSMENT:    ICD-10-CM    1. Dry eyes  H04.123 EYE EXAM (SIMPLE-NONBILLABLE)     REFRACTION   2. Regular astigmatism of both eyes  H52.223 EYE EXAM (SIMPLE-NONBILLABLE)     REFRACTION   3. Anisocoria  H57.02 EYE EXAM (SIMPLE-NONBILLABLE)     REFRACTION   4. Embolic stroke involving right cerebral artery, 1998  I63.40 EYE EXAM (SIMPLE-NONBILLABLE)     REFRACTION      PLAN:     Patient Instructions   Fill glasses prescription  Allow 2 weeks to adapt to change in glasses  Return to clinic to verify glasses and for glasses check as needed.   Use over the counter " artificial tears 2-4  times a day (Thera Tears , Thera Tears Extra, Systane Complete or Refresh Optive)  Return in 1 year for eye exam    Karma Waterman, OD  [phone]                       Again, thank you for allowing me to participate in the care of your patient.        Sincerely,        Karma Waterman, OD

## 2021-02-05 ENCOUNTER — APPOINTMENT (OUTPATIENT)
Dept: OPTOMETRY | Facility: CLINIC | Age: 41
End: 2021-02-05
Payer: COMMERCIAL

## 2021-02-05 PROCEDURE — 92340 FIT SPECTACLES MONOFOCAL: CPT | Performed by: OPTOMETRIST

## 2021-02-25 ENCOUNTER — OFFICE VISIT (OUTPATIENT)
Dept: FAMILY MEDICINE | Facility: CLINIC | Age: 41
End: 2021-02-25
Payer: COMMERCIAL

## 2021-02-25 VITALS
HEART RATE: 81 BPM | BODY MASS INDEX: 25.06 KG/M2 | WEIGHT: 164.8 LBS | OXYGEN SATURATION: 100 % | SYSTOLIC BLOOD PRESSURE: 123 MMHG | DIASTOLIC BLOOD PRESSURE: 81 MMHG | TEMPERATURE: 98.3 F

## 2021-02-25 DIAGNOSIS — S80.12XS LEG HEMATOMA, LEFT, SEQUELA: Primary | ICD-10-CM

## 2021-02-25 DIAGNOSIS — Z79.01 CHRONIC ANTICOAGULATION: ICD-10-CM

## 2021-02-25 LAB — HGB BLD-MCNC: 14.4 G/DL (ref 13.3–17.7)

## 2021-02-25 PROCEDURE — 99213 OFFICE O/P EST LOW 20 MIN: CPT | Performed by: NURSE PRACTITIONER

## 2021-02-25 PROCEDURE — 85018 HEMOGLOBIN: CPT | Performed by: NURSE PRACTITIONER

## 2021-02-25 PROCEDURE — 36415 COLL VENOUS BLD VENIPUNCTURE: CPT | Performed by: NURSE PRACTITIONER

## 2021-02-25 RX ORDER — OXYCODONE HYDROCHLORIDE 5 MG/1
5 TABLET ORAL EVERY 6 HOURS PRN
Qty: 30 TABLET | Refills: 0 | Status: SHIPPED | OUTPATIENT
Start: 2021-02-25 | End: 2021-03-03

## 2021-02-25 NOTE — PROGRESS NOTES
Assessment & Plan     Leg hematoma, left, sequela  Will check Hgb today and again on Monday as patient ramps of warfarin to ensure no active bleeding.  Patient advised to seek emergency care for dizziness, worsening pain, swelling.  He will also monitor for s/s of infection.    Patient to continue ice and compression.  - Hemoglobin  - **Hemoglobin FUTURE anytime; Future  - oxyCODONE (ROXICODONE) 5 MG tablet; Take 1 tablet (5 mg) by mouth every 6 hours as needed for severe pain    Chronic anticoagulation  As above. Followed by cardiology.                   Return in about 1 week (around 3/4/2021) for no improvement in symptoms., Follow-up sooner if symptoms worsen..    VITA Javed CNP  [facility] FREDIS Sultana is a 40 year old who presents for the following health issues     HPI       ED/UC Followup:    Facility:  [facility]  Date of visit: February 21, 2021  Reason for visit: Spontaneous hematoma of thigh (Primary Dx);   Warfarin anticoagulation  Current Status: swelling is getting better, bruising is worse. Still every painful. Needs work note     Impression and Plan:   Rd Rodarte is a 40 y.o. male with a past medical history of bicuspid aortic valve status post replacement anticoagulated on warfarin who presents with spontaneous swelling to his left thigh. CT angiography shows quadriceps vastus lateralis hematoma with small area of blush. He is hemodynamically stable with normal vitals, and his hemoglobin is normal. His platelets are slightly low of unclear etiology, however, his hematoma is external and identifiable on clinical exam and compressible. I discussed the case with Dr. Walters of interventional radiology who agreed with initial conservative management with compression, elevation and ice pack. I discussed admission vs discharge with the patient and his partner. I think risk benefit favors continuing his warfarin after skipping a dose,  regarding his valve. Patient's preference was to go home, and I think given his otherwise good health, reliable partner to be with him, and strict return precautions, this is reasonable to continue outpatient conservative management. His female partner is medically trained and they feel safe going home with the plan of rest, elevation, compression, reassess in the next few days. Work note restrictions given. Opioid small quantity for pain, opioid precautions and return precautions emphasized with both.     Diagnosis:   ENCOUNTER DIAGNOSES   ICD-10-CM   1. Spontaneous hematoma of thigh R23.3 oxyCODONE (ROXICODONE) 5 mg immediate release tablet   DISCONTINUED: oxyCODONE-acetaminophen, 5-325 mg, (PERCOCET) 5-325 mg per tablet   2. Warfarin anticoagulation Z79.01     Patient notes that the swelling has decreased significantly, but bruising has worsened.  He continues to have significant pain.  He does note some difficulty with range of motion of his left thigh, but this is improving.  He denies paresthesias, redness, fever, dizziness.  His INR was 1.4 today and he will be increasing his warfarin.  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  He continues with ice and wrapping.  He notes oxycodone was helpful for pain, but he is now out of medication.    Review of Systems   Constitutional, HEENT, cardiovascular, pulmonary, gi and gu systems are negative, except as otherwise noted.      Objective    /81   Pulse 81   Temp 98.3  F (36.8  C)   Wt 74.8 kg (164 lb 12.8 oz)   SpO2 100%   BMI 25.06 kg/m    Body mass index is 25.06 kg/m .  Physical Exam   GENERAL: healthy, alert and no distress  RESP: lungs clear to auscultation - no rales, rhonchi or wheezes  CV: regular rates and rhythm, normal S1 S2, no S3 or S4, peripheral pulses strong, no peripheral edema and mechanical valve sounds present  MS: no gross musculoskeletal defects noted, no edema  SKIN: large area of  ecchymosis to left lateral thigh surrounding area of scarring, ecchymosis extends from upper thigh to level of the knee.

## 2021-02-26 NOTE — RESULT ENCOUNTER NOTE
Dear Rd,    Your recent test results are attached.      Hemoglobin improved from ER visit.    If you have any questions please feel free to contact [phone] or myself via [employer].    Sincerely,  Tonia Nino, CNP

## 2021-03-01 ENCOUNTER — TELEPHONE (OUTPATIENT)
Dept: FAMILY MEDICINE | Facility: CLINIC | Age: 41
End: 2021-03-01

## 2021-03-01 DIAGNOSIS — S80.12XS LEG HEMATOMA, LEFT, SEQUELA: ICD-10-CM

## 2021-03-01 LAB — HGB BLD-MCNC: 14.9 G/DL (ref 13.3–17.7)

## 2021-03-01 PROCEDURE — 36415 COLL VENOUS BLD VENIPUNCTURE: CPT | Performed by: NURSE PRACTITIONER

## 2021-03-01 PROCEDURE — 85018 HEMOGLOBIN: CPT | Performed by: NURSE PRACTITIONER

## 2021-03-01 NOTE — RESULT ENCOUNTER NOTE
Dear Rd,    Your recent test results are attached.      Stable hemoglobin.  Please let me know if you symptoms are not improving.    If you have any questions please feel free to contact [phone] or myself via [employer].    Sincerely,  Tonia Nino, CNP

## 2021-03-01 NOTE — TELEPHONE ENCOUNTER
Reason for Call:  Form, our goal is to have forms completed with 72 hours, however, some forms may require a visit or additional information.    Type of letter, form or note:  medical    Who is the form from?: Patient    Where did the form come from: Patient or family brought in       What clinic location was the form placed at?: [facility]    Where the form was placed: Given to physician    What number is listed as a contact on the form?: [phone]       Additional comments: Please call to  at     Call taken on 3/1/2021 at 3:26 PM by Mariangel Peng

## 2021-03-02 NOTE — TELEPHONE ENCOUNTER
"Per Tonia \"can we verify that he's ready to return to work?\".    I called patient.  He said he has swelling in his ankle and calf and would like to be seen.    Appointment scheduled for tomorrow at 10:20 a.m. Marcela Robert,     "

## 2021-03-02 NOTE — TELEPHONE ENCOUNTER
[facility] Attending Provider Statement received and given to Tonia Nino CNP to complete and sign. Marcela Robert,

## 2021-03-03 ENCOUNTER — OFFICE VISIT (OUTPATIENT)
Dept: FAMILY MEDICINE | Facility: CLINIC | Age: 41
End: 2021-03-03
Payer: COMMERCIAL

## 2021-03-03 VITALS
HEIGHT: 67 IN | DIASTOLIC BLOOD PRESSURE: 80 MMHG | HEART RATE: 78 BPM | BODY MASS INDEX: 25.39 KG/M2 | TEMPERATURE: 97.5 F | OXYGEN SATURATION: 100 % | WEIGHT: 161.8 LBS | SYSTOLIC BLOOD PRESSURE: 120 MMHG

## 2021-03-03 DIAGNOSIS — S80.12XS LEG HEMATOMA, LEFT, SEQUELA: ICD-10-CM

## 2021-03-03 PROCEDURE — 99213 OFFICE O/P EST LOW 20 MIN: CPT | Performed by: NURSE PRACTITIONER

## 2021-03-03 RX ORDER — OXYCODONE HYDROCHLORIDE 5 MG/1
5 TABLET ORAL EVERY 6 HOURS PRN
Qty: 30 TABLET | Refills: 0 | Status: SHIPPED | OUTPATIENT
Start: 2021-03-03 | End: 2024-06-17

## 2021-03-03 ASSESSMENT — PAIN SCALES - GENERAL: PAINLEVEL: MODERATE PAIN (4)

## 2021-03-03 ASSESSMENT — MIFFLIN-ST. JEOR: SCORE: 1596.42

## 2021-03-03 NOTE — PROGRESS NOTES
"    Assessment & Plan     Leg hematoma, left, sequela  Patient is okay to return to work with restrictions for 1 week.  Patient to continue to monitor for increased pain, bruising, erythema or signs of infection.  - oxyCODONE (ROXICODONE) 5 MG tablet; Take 1 tablet (5 mg) by mouth every 6 hours as needed for severe pain             BMI:   Estimated body mass index is 25.4 kg/m  as calculated from the following:    Height as of this encounter: 1.7 m (5' 6.93\").    Weight as of this encounter: 73.4 kg (161 lb 12.8 oz).           No follow-ups on file.    Tonia Nino, VITA CNP  [facility] FREDIS Sultana is a 40 year old who presents for the following health issues     HPI   Chief Complaint   Patient presents with     RECHECK     Hematoma-left leg       Patient notes that the swelling has moved down his leg.  He notes pain over his knee with walking and to his left lateral ankle.  He denies fever, no ecchymosis.  He continues to use oxycodone prn, but not as frequently.  He is also using tylenol.  He notes that he needs to return to work financially, but is afraid of re-injury.  He has a repeat INR in 2 days.  His Hgb has been stable.    Review of Systems   Constitutional, HEENT, cardiovascular, pulmonary, gi and gu systems are negative, except as otherwise noted.      Objective    /80   Pulse 78   Temp 97.5  F (36.4  C) (Oral)   Ht 1.7 m (5' 6.93\")   Wt 73.4 kg (161 lb 12.8 oz)   SpO2 100%   BMI 25.40 kg/m    Body mass index is 25.4 kg/m .  Physical Exam   GENERAL: healthy, alert and no distress  MS: Swelling noted over knee, left lateral ankle, along with edema.  Mild erythema, but no warmth noted to left knee.                  "

## 2021-03-26 ENCOUNTER — ANCILLARY PROCEDURE (OUTPATIENT)
Dept: GENERAL RADIOLOGY | Facility: CLINIC | Age: 41
End: 2021-03-26
Attending: INTERNAL MEDICINE
Payer: COMMERCIAL

## 2021-03-26 ENCOUNTER — OFFICE VISIT (OUTPATIENT)
Dept: INTERNAL MEDICINE | Facility: CLINIC | Age: 41
End: 2021-03-26
Payer: COMMERCIAL

## 2021-03-26 VITALS
HEART RATE: 92 BPM | OXYGEN SATURATION: 100 % | BODY MASS INDEX: 25.27 KG/M2 | TEMPERATURE: 98.2 F | RESPIRATION RATE: 14 BRPM | SYSTOLIC BLOOD PRESSURE: 133 MMHG | WEIGHT: 161 LBS | DIASTOLIC BLOOD PRESSURE: 89 MMHG

## 2021-03-26 DIAGNOSIS — M25.50 MULTIPLE JOINT PAIN: ICD-10-CM

## 2021-03-26 DIAGNOSIS — M25.50 MULTIPLE JOINT PAIN: Primary | ICD-10-CM

## 2021-03-26 DIAGNOSIS — Z86.79 HISTORY OF AORTIC VALVE REPAIR: ICD-10-CM

## 2021-03-26 DIAGNOSIS — I63.40: ICD-10-CM

## 2021-03-26 DIAGNOSIS — Z98.890 HISTORY OF AORTIC VALVE REPAIR: ICD-10-CM

## 2021-03-26 DIAGNOSIS — Z98.890 HISTORY OF FASCIOTOMY: ICD-10-CM

## 2021-03-26 LAB
ALBUMIN SERPL-MCNC: 3.9 G/DL (ref 3.4–5)
ALP SERPL-CCNC: 63 U/L (ref 40–150)
ALT SERPL W P-5'-P-CCNC: 23 U/L (ref 0–70)
ANION GAP SERPL CALCULATED.3IONS-SCNC: 2 MMOL/L (ref 3–14)
AST SERPL W P-5'-P-CCNC: 15 U/L (ref 0–45)
B BURGDOR IGG+IGM SER QL: 0.08 (ref 0–0.89)
BASOPHILS # BLD AUTO: 0 10E9/L (ref 0–0.2)
BASOPHILS NFR BLD AUTO: 0.4 %
BILIRUB SERPL-MCNC: 0.2 MG/DL (ref 0.2–1.3)
BUN SERPL-MCNC: 14 MG/DL (ref 7–30)
CALCIUM SERPL-MCNC: 8.8 MG/DL (ref 8.5–10.1)
CHLORIDE SERPL-SCNC: 111 MMOL/L (ref 94–109)
CO2 SERPL-SCNC: 28 MMOL/L (ref 20–32)
CREAT SERPL-MCNC: 0.86 MG/DL (ref 0.66–1.25)
CRP SERPL-MCNC: <2.9 MG/L (ref 0–8)
DIFFERENTIAL METHOD BLD: ABNORMAL
EOSINOPHIL # BLD AUTO: 0.1 10E9/L (ref 0–0.7)
EOSINOPHIL NFR BLD AUTO: 2.2 %
ERYTHROCYTE [DISTWIDTH] IN BLOOD BY AUTOMATED COUNT: 13.4 % (ref 10–15)
ERYTHROCYTE [SEDIMENTATION RATE] IN BLOOD BY WESTERGREN METHOD: 1 MM/H (ref 0–15)
GFR SERPL CREATININE-BSD FRML MDRD: >90 ML/MIN/{1.73_M2}
GLUCOSE SERPL-MCNC: 114 MG/DL (ref 70–99)
HCT VFR BLD AUTO: 48.5 % (ref 40–53)
HCV AB SERPL QL IA: NONREACTIVE
HGB BLD-MCNC: 16.3 G/DL (ref 13.3–17.7)
LYMPHOCYTES # BLD AUTO: 1 10E9/L (ref 0.8–5.3)
LYMPHOCYTES NFR BLD AUTO: 18.4 %
MCH RBC QN AUTO: 31.7 PG (ref 26.5–33)
MCHC RBC AUTO-ENTMCNC: 33.6 G/DL (ref 31.5–36.5)
MCV RBC AUTO: 94 FL (ref 78–100)
MONOCYTES # BLD AUTO: 0.6 10E9/L (ref 0–1.3)
MONOCYTES NFR BLD AUTO: 10.1 %
NEUTROPHILS # BLD AUTO: 3.8 10E9/L (ref 1.6–8.3)
NEUTROPHILS NFR BLD AUTO: 68.9 %
PLATELET # BLD AUTO: 143 10E9/L (ref 150–450)
POTASSIUM SERPL-SCNC: 4.1 MMOL/L (ref 3.4–5.3)
PROT SERPL-MCNC: 6.7 G/DL (ref 6.8–8.8)
RBC # BLD AUTO: 5.14 10E12/L (ref 4.4–5.9)
SODIUM SERPL-SCNC: 141 MMOL/L (ref 133–144)
URATE SERPL-MCNC: 6.2 MG/DL (ref 3.5–7.2)
WBC # BLD AUTO: 5.4 10E9/L (ref 4–11)

## 2021-03-26 PROCEDURE — 85025 COMPLETE CBC W/AUTO DIFF WBC: CPT | Performed by: INTERNAL MEDICINE

## 2021-03-26 PROCEDURE — 73070 X-RAY EXAM OF ELBOW: CPT | Mod: LT | Performed by: RADIOLOGY

## 2021-03-26 PROCEDURE — 73610 X-RAY EXAM OF ANKLE: CPT | Mod: RT | Performed by: RADIOLOGY

## 2021-03-26 PROCEDURE — 86618 LYME DISEASE ANTIBODY: CPT | Performed by: INTERNAL MEDICINE

## 2021-03-26 PROCEDURE — 99214 OFFICE O/P EST MOD 30 MIN: CPT | Performed by: INTERNAL MEDICINE

## 2021-03-26 PROCEDURE — 85652 RBC SED RATE AUTOMATED: CPT | Performed by: INTERNAL MEDICINE

## 2021-03-26 PROCEDURE — 73560 X-RAY EXAM OF KNEE 1 OR 2: CPT | Mod: LT | Performed by: RADIOLOGY

## 2021-03-26 PROCEDURE — 86431 RHEUMATOID FACTOR QUANT: CPT | Performed by: INTERNAL MEDICINE

## 2021-03-26 PROCEDURE — 80053 COMPREHEN METABOLIC PANEL: CPT | Performed by: INTERNAL MEDICINE

## 2021-03-26 PROCEDURE — 86747 PARVOVIRUS ANTIBODY: CPT | Mod: 90 | Performed by: INTERNAL MEDICINE

## 2021-03-26 PROCEDURE — 86140 C-REACTIVE PROTEIN: CPT | Performed by: INTERNAL MEDICINE

## 2021-03-26 PROCEDURE — 84550 ASSAY OF BLOOD/URIC ACID: CPT | Performed by: INTERNAL MEDICINE

## 2021-03-26 PROCEDURE — 99000 SPECIMEN HANDLING OFFICE-LAB: CPT | Performed by: INTERNAL MEDICINE

## 2021-03-26 PROCEDURE — 73130 X-RAY EXAM OF HAND: CPT | Mod: RT | Performed by: RADIOLOGY

## 2021-03-26 PROCEDURE — 36415 COLL VENOUS BLD VENIPUNCTURE: CPT | Performed by: INTERNAL MEDICINE

## 2021-03-26 PROCEDURE — 86803 HEPATITIS C AB TEST: CPT | Performed by: INTERNAL MEDICINE

## 2021-03-26 PROCEDURE — 86200 CCP ANTIBODY: CPT | Performed by: INTERNAL MEDICINE

## 2021-03-26 NOTE — LETTER
March 30, 2021    Rd Rodarte  [address] [place] [address]          Dear ,    We are writing to inform you of your test results.  Every single one of these tests is within acceptable limits and doesn't help us to make any specific diagnosis of autoimmune disease     It's still a reasonable idea to have a further follow up appointment with the rheumatologist       Resulted Orders   Erythrocyte sedimentation rate auto   Result Value Ref Range    Sed Rate 1 0 - 15 mm/h   CRP inflammation   Result Value Ref Range    CRP Inflammation <2.9 0.0 - 8.0 mg/L   Uric acid   Result Value Ref Range    Uric Acid 6.2 3.5 - 7.2 mg/dL   Rheumatoid factor   Result Value Ref Range    Rheumatoid Factor <7 <12 IU/mL   Cyclic Citrullinated Peptide Antibody IgG   Result Value Ref Range    Cyclic Citrullinated Peptide Antibody, IgG <1 <7 U/mL      Comment:      Negative   Hepatitis C antibody   Result Value Ref Range    Hepatitis C Antibody Nonreactive NR^Nonreactive      Comment:      Assay performance characteristics have not been established for newborns,   infants, and children     Comprehensive metabolic panel   Result Value Ref Range    Sodium 141 133 - 144 mmol/L    Potassium 4.1 3.4 - 5.3 mmol/L    Chloride 111 (H) 94 - 109 mmol/L    Carbon Dioxide 28 20 - 32 mmol/L    Anion Gap 2 (L) 3 - 14 mmol/L    Glucose 114 (H) 70 - 99 mg/dL      Comment:      Non Fasting    Urea Nitrogen 14 7 - 30 mg/dL    Creatinine 0.86 0.66 - 1.25 mg/dL    GFR Estimate >90 >60 mL/min/[1.73_m2]      Comment:      Non  GFR Calc  Starting 12/18/2018, serum creatinine based estimated GFR (eGFR) will be   calculated using the Chronic Kidney Disease Epidemiology Collaboration   (CKD-EPI) equation.      GFR Estimate If Black >90 >60 mL/min/[1.73_m2]      Comment:       GFR Calc  Starting 12/18/2018, serum creatinine based estimated GFR (eGFR) will be   calculated using the Chronic Kidney Disease Epidemiology  Collaboration   (CKD-EPI) equation.      Calcium 8.8 8.5 - 10.1 mg/dL    Bilirubin Total 0.2 0.2 - 1.3 mg/dL    Albumin 3.9 3.4 - 5.0 g/dL    Protein Total 6.7 (L) 6.8 - 8.8 g/dL    Alkaline Phosphatase 63 40 - 150 U/L    ALT 23 0 - 70 U/L    AST 15 0 - 45 U/L   Lyme Disease Ivana with reflex to WB Serum   Result Value Ref Range    Lyme Disease Antibodies Serum 0.08 0.00 - 0.89      Comment:      Negative, Absence of detectable Borrelia burdorferi antibodies. A negative   result does not exclude the possibility of Borrelia burgdorferi infection. If   early Lyme disease is suspected, a second sample should be collected and   tested 2 to 4 weeks later.     Parvovirus B19 antibodies IgG IgM   Result Value Ref Range    Parvovirus B19 IgG 0.22 <=0.90 IV      Comment:      (Note)  INTERPRETIVE INFORMATION: Parvovirus B19 Antibody, IgG   0.90 IV or less .......... Negative - No significant                              level of detectable Parvovirus                              B19 IgG antibody.   0.91 - 1.09 IV ........... Equivocal - Repeat testing in                              7-21 days may be helpful.   1.10 IV or greater ....... Positive - IgG antibody to                              Parvovirus B19 detected which                              may indicate a current or                              past infection.  The best evidence for current infection is a significant   change on two appropriately timed specimens, where both   tests are done in the same laboratory at the same time.      Parvovirus B19 IgM 0.10 <=0.90 IV      Comment:      (Note)  INTERPRETIVE INFORMATION: Parvovirus B19 Antibody, IgM   0.90 IV or less .......... Negative - No significant                              level of detectable Parvovirus                              B19 IgM antibody.   0.91 - 1.09 IV ........... Equivocal - Repeat testing in                              7-21 days may be helpful.   1.10 IV or greater ........ Positive - IgM  antibody to                              Parvovirus B19 detected which                              may indicate a current or                              recent infection. However, low                              levels of IgM antibodies may                              occasionally persist for more                              than 12 months post-infection.  The best evidence for current infection is a significant   change on two appropriately timed specimens, where both   tests are done in the same laboratory at the same time.  Appearance of an IgM antibody response normally occurs 7 to   14 days after th e onset of disease. Testing immediately   post-exposure is of no value without a later convalescent   specimen. A residual IgM response may be distinguished from   early IgM response to infection by testing sera from   patients three to four weeks later for changing levels of   specific IgM antibodies.  Performed By: [facility]  [address] [place] [address]  : Linda Bliss MD     CBC with platelets differential   Result Value Ref Range    WBC 5.4 4.0 - 11.0 10e9/L    RBC Count 5.14 4.4 - 5.9 10e12/L    Hemoglobin 16.3 13.3 - 17.7 g/dL    Hematocrit 48.5 40.0 - 53.0 %    MCV 94 78 - 100 fl    MCH 31.7 26.5 - 33.0 pg    MCHC 33.6 31.5 - 36.5 g/dL    RDW 13.4 10.0 - 15.0 %    Platelet Count 143 (L) 150 - 450 10e9/L    Diff Method Automated Method     % Neutrophils 68.9 %    % Lymphocytes 18.4 %    % Monocytes 10.1 %    % Eosinophils 2.2 %    % Basophils 0.4 %    Absolute Neutrophil 3.8 1.6 - 8.3 10e9/L    Absolute Lymphocytes 1.0 0.8 - 5.3 10e9/L    Absolute Monocytes 0.6 0.0 - 1.3 10e9/L    Absolute Eosinophils 0.1 0.0 - 0.7 10e9/L    Absolute Basophils 0.0 0.0 - 0.2 10e9/L       If you have any questions or concerns, please call the clinic at the number listed above.       Sincerely,      Sebastian Nj MD
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
## 2021-03-28 LAB — CCP AB SER IA-ACNC: <1 U/ML

## 2021-03-29 LAB — RHEUMATOID FACT SER NEPH-ACNC: <7 IU/ML (ref 0–20)

## 2021-03-30 LAB
B19V IGG SER IA-ACNC: 0.22 IV
B19V IGM SER IA-ACNC: 0.1 IV

## 2021-04-22 ENCOUNTER — TELEPHONE (OUTPATIENT)
Dept: FAMILY MEDICINE | Facility: CLINIC | Age: 41
End: 2021-04-22

## 2021-04-22 DIAGNOSIS — M25.50 MULTIPLE JOINT PAIN: Primary | ICD-10-CM

## 2021-04-22 NOTE — TELEPHONE ENCOUNTER
Reason for Call:  Other call back    Detailed comments: patient called today.  Patient needs a referral for [facility] Orthopedic Surgeon appontment f/u from seeing Dr. Nj at [facility].  Patient would like [place], ([facility] offers non surgical only).  Please contact patient if needed.  Thank you.    Phone Number Patient can be reached at: Home number on file [phone] (home) and cell phone okay as well    Best Time: anytime Friday please.    Can we leave a detailed message on this number? YES    Call taken on 4/22/2021 at 12:38 PM by Noemi Welsh

## 2021-04-23 NOTE — TELEPHONE ENCOUNTER
"Per result note from 3/31/21:  Written by Sebastian Nj MD on 3/28/2021 12:09 PM  Reading this xray report shows the result of a possible older cartilaginous injury and some mild osteoarthritis changes. I am not so sure there's anything easily that could be done for this. It would be reasonable to consult with an orthopedist surgeon about your elbow, as far as what the future holds and if you've got any treatment options .     Sebastian Nj MD\"    Referral pended. Please advise.    Malka Cuevas RN  [facility]    "

## 2021-04-28 ENCOUNTER — OFFICE VISIT (OUTPATIENT)
Dept: ORTHOPEDICS | Facility: CLINIC | Age: 41
End: 2021-04-28
Attending: INTERNAL MEDICINE
Payer: COMMERCIAL

## 2021-04-28 VITALS
SYSTOLIC BLOOD PRESSURE: 120 MMHG | HEIGHT: 67 IN | BODY MASS INDEX: 25.24 KG/M2 | OXYGEN SATURATION: 96 % | WEIGHT: 160.8 LBS | DIASTOLIC BLOOD PRESSURE: 83 MMHG | HEART RATE: 77 BPM

## 2021-04-28 DIAGNOSIS — M77.02 MEDIAL EPICONDYLITIS, LEFT ELBOW: Primary | ICD-10-CM

## 2021-04-28 PROCEDURE — 20551 NJX 1 TENDON ORIGIN/INSJ: CPT | Mod: LT | Performed by: ORTHOPAEDIC SURGERY

## 2021-04-28 PROCEDURE — 99243 OFF/OP CNSLTJ NEW/EST LOW 30: CPT | Mod: 25 | Performed by: ORTHOPAEDIC SURGERY

## 2021-04-28 RX ORDER — TRIAMCINOLONE ACETONIDE 40 MG/ML
40 INJECTION, SUSPENSION INTRA-ARTICULAR; INTRAMUSCULAR
Status: SHIPPED | OUTPATIENT
Start: 2021-04-28

## 2021-04-28 RX ADMIN — TRIAMCINOLONE ACETONIDE 40 MG: 40 INJECTION, SUSPENSION INTRA-ARTICULAR; INTRAMUSCULAR at 10:57

## 2021-04-28 ASSESSMENT — MIFFLIN-ST. JEOR: SCORE: 1593.01

## 2021-04-28 ASSESSMENT — PAIN SCALES - GENERAL: PAINLEVEL: MODERATE PAIN (5)

## 2021-04-28 NOTE — PROGRESS NOTES
"Chief Complaint:   Chief Complaint   Patient presents with     Left Elbow - Pain     Patient reports for left elbow pain. Pain is reported as 5/10. Lifting objects tend to increase the pain. Patient has tired rest to alleviate the pain but no other remedies.       Rd Rodarte is seen today in the [facility] for evaluation of left elbow pain at the request of Dr. Sebastian Nj       HPI: Rd Rodarte is a 40 year old male , right -hand dominant, who presents for evaluation and management of a left elbow pain, without specific injury. Pain has been present for 4-5 years. Related possibly to a bad wrist injury 1999, treated with \"traction\" at the wrist but thinks compensation affected the elbow. Locates pain along the inner aspect of the elbow, aggravated with lifting. Treatment with rest, previous use of a elbow brace, otherwise no treatments.    Has numbness and tingling first thing in the morning, notes the long, ring and small fingers.    Treatment with chiropractor for back, neck, numbness and tingling. Starting Physical Therapy next week for the neck.    He reports numerous joint aches/pains, from the neck and back, elbow, hand, wrist, knee, ankle.      He reports having mild pain/discomfort at the elbow.   Pain severity: 5/10  Pain quality: dull, aching, sharp and shooting  Frequency of symptoms: are constant  Associated symptoms: numbness/tingling into ulnar 3 digits  Aggravated by: activities  Relieved by: \"nothing\"    Usual level of work activity: .    Past medical history:  has a past medical history of Chest wall pain, Chronic anticoagulation, Congestive heart failure, unspecified (2011), History of aortic valve repair, Myocardial infarction (H), Prophylactic antibiotic, Smoking, and Unspecified cerebral artery occlusion with cerebral infarction (Feb 1998).   Patient Active Problem List   Diagnosis     History of aortic valve repair in 1984, AVR in 1989 and 2nd AVR " in 2012     Smoking     Chronic anticoagulation     Prophylactic antibiotic     Chest wall pain     Congestive heart failure (H)     CARDIOVASCULAR SCREENING; LDL GOAL LESS THAN 100     Anisocoria     Embolic stroke involving right cerebral artery, 1998     Closed Colles' fracture of left radius with malunion       Past surgical history:  has a past surgical history that includes Redo sternotomy replace valve aortic (12/27/2011); Redo sternotomy repair valve aortic (1989); Repair valve aortic child (1984); and Wrist surgery (june 1999).     Medications:   Current Outpatient Medications:      Multiple Vitamins-Minerals (MULTIVITAMIN OR), , Disp: , Rfl:      oxyCODONE (ROXICODONE) 5 MG tablet, Take 1 tablet (5 mg) by mouth every 6 hours as needed for severe pain (Patient not taking: Reported on 3/26/2021), Disp: 30 tablet, Rfl: 0     warfarin (COUMADIN) 7.5 MG tablet, Take 1 tablet by mouth daily., Disp: , Rfl:      Allergies:     Allergies   Allergen Reactions     Amoxicillin      Swelling - this reaction was at age 4. He was given amoxicillin as a prophylactic antibiotic before open heart surgery without a reaction in the 1980's and 2012        Ceclor Cd [Cefaclor Monohydrate]      Rash       Erythromycin      Rash       Penicillins      Swelling          Family History: family history includes Heart Disease in his father and maternal grandfather.     Social History: [employer]  reports that he quit smoking about 9 years ago. He has never used smokeless tobacco. He reports current alcohol use. He reports that he does not use drugs.    Review of Systems:  ROS: 10 point ROS neg other than the symptoms noted above in the HPI and past medical history.    Physical Exam  GENERAL APPEARANCE: healthy, alert, no distress.   SKIN: no suspicious lesions or rashes  RESPIRATORY: No increased work of breathing.  NEURO: Normal strength and tone, mentation intact and speech normal  PSYCH:  mentation  "appears normal and affect normal. Not anxious.  Hands: no clubbing, nail pitting or nodes.    /83   Pulse 77   Ht 1.702 m (5' 7\")   Wt 72.9 kg (160 lb 12.8 oz)   SpO2 96%   BMI 25.18 kg/m         LEFT UPPER EXTREMITY:    Sensation intact to light touch in median, radial, ulnar and axillary nerve distributions  Palpable 2+ radial pulse, brisk capillary refill to all fingers, wwp  Intact epl fpl fdp edc wrist flexion/extension biceps triceps deltoid  DTR's normal biceps and brachioradialis  Tinels over median nerve at the wrist positive for \"pins and needles\" to the ring/small finger but negative for the radial digits  Median nerve compression test negative.  At times, almost \"postures\" with the left hand into a claw-type posture (states he has had that since about age 19)    ELBOW:  Skin intact. No open wounds. No skin maceration.  Inspection: no swelling, no ecchymosis, no olecranon bursa swelling, no distal bicep tendon defect  Tender: medial epicondyle, common flexor tendon and flexor muscle of forearm  Non-tender: lateral epicondyle, common extensor tendon, extensor muscle of forearm, supracondylar notch, olecranon bursa, distal bicep tendon and radial head/neck  Range of Motion: all normal  Strength: elbow strength full  Special tests: normal stability, ulnar Tinel's negative, no pain with resisted wrist extension, no pain with resisted middle finger extension, pain with resisted wrist flexion:   There is no gross deformity in the area.        X-rays:  2 views left elbow previously obtained 3/26/2021 were reviewed in clinic today.  Subchondral cystic change of the capitellum with mild marginal osteophyte formation in the radiocapitellar joint. This could be degenerative in nature or related to previous osteochondral injury. No acute fracture or joint effusion.        Assessment: 40 year old male , right -hand dominant, with chronic left elbow pain, medial epicondylitis; possible ulnar " "neuropathy    Plan:   * discussed with patient history and clinical exam consistent with \"golfer's\" elbow, or medial epicondylitis, which is tendonitis at the medial or inner aspect of the elbow. This is where the flexor tendons originate for the wrist.  * treatment is nonoperative, with surgical treatment reserved for recalcitrant symptoms despite long-term nonperative treatment regimen  * tensing of the muscles may be causing some pressure on the ulnar nerve, causing numbness and tingling.  * if this continues, would recommend EMG evaluation with neurology.    Treatment includes:  * rest  * activity modification - avoid aggravating activities and reduce strenuous activities for 6-8 weeks  * ice, ice massage  * Physical therapy, modalities as indicated including ultrasound, massage, iontophoresis  * counterforce elbow brace/strap, available OTC  - has done in past without relief.  * NDAIDS regularly three times daily x2-3 weeks  * cortisone injection discussed, placed at point of maximal tenderness if needed. He elects to proceed, see procedure note below.  * return to clinic as needed.  .      Jeremias Emerson M.D., M.S.  Dept. of Orthopaedic Surgery  [facility]      Hand / Upper Extremity Injection/Arthrocentesis: L elbow    Date/Time: 2021 10:57 AM  Performed by: Sebastian Nj MD  Authorized by: Sebastian Nj MD     Indications:  Pain  Needle Size:  25 G  Guidance: landmark    Approach:  Medial  Condition: epicondylitis, medial      Site:  L elbow  Medications:  40 mg triamcinolone 40 MG/ML  Outcome:  Tolerated well, no immediate complications  Procedure discussed: discussed risks, benefits, and alternatives    Timeout: timeout called immediately prior to procedure    Prep: patient was prepped and draped in usual sterile fashion     1.0 CC Bupivacaine 0.25% NDC 30579-380-50, LOT [id number],  OCT 2021        "

## 2021-04-28 NOTE — LETTER
"    4/28/2021         RE: Rd Rodarte  [address] [place] [address]        Dear Colleague,    Thank you for referring your patient, Rd Rodarte, to the [facility] [place]. Please see a copy of my visit note below.    Chief Complaint:   Chief Complaint   Patient presents with     Left Elbow - Pain     Patient reports for left elbow pain. Pain is reported as 5/10. Lifting objects tend to increase the pain. Patient has tired rest to alleviate the pain but no other remedies.       Rd Rodarte is seen today in the [facility] for evaluation of left elbow pain at the request of Dr. Sebastian Nj       HPI: Rd Rodarte is a 40 year old male , right -hand dominant, who presents for evaluation and management of a left elbow pain, without specific injury. Pain has been present for 4-5 years. Related possibly to a bad wrist injury 1999, treated with \"traction\" at the wrist but thinks compensation affected the elbow. Locates pain along the inner aspect of the elbow, aggravated with lifting. Treatment with rest, previous use of a elbow brace, otherwise no treatments.    Has numbness and tingling first thing in the morning, notes the long, ring and small fingers.    Treatment with chiropractor for back, neck, numbness and tingling. Starting Physical Therapy next week for the neck.    He reports numerous joint aches/pains, from the neck and back, elbow, hand, wrist, knee, ankle.      He reports having mild pain/discomfort at the elbow.   Pain severity: 5/10  Pain quality: dull, aching, sharp and shooting  Frequency of symptoms: are constant  Associated symptoms: numbness/tingling into ulnar 3 digits  Aggravated by: activities  Relieved by: \"nothing\"    Usual level of work activity: .    Past medical history:  has a past medical history of Chest wall pain, Chronic anticoagulation, Congestive heart failure, unspecified (2011), History of aortic valve " repair, Myocardial infarction (H), Prophylactic antibiotic, Smoking, and Unspecified cerebral artery occlusion with cerebral infarction (Feb 1998).   Patient Active Problem List   Diagnosis     History of aortic valve repair in 1984, AVR in 1989 and 2nd AVR in 2012     Smoking     Chronic anticoagulation     Prophylactic antibiotic     Chest wall pain     Congestive heart failure (H)     CARDIOVASCULAR SCREENING; LDL GOAL LESS THAN 100     Anisocoria     Embolic stroke involving right cerebral artery, 1998     Closed Colles' fracture of left radius with malunion       Past surgical history:  has a past surgical history that includes Redo sternotomy replace valve aortic (12/27/2011); Redo sternotomy repair valve aortic (1989); Repair valve aortic child (1984); and Wrist surgery (june 1999).     Medications:   Current Outpatient Medications:      Multiple Vitamins-Minerals (MULTIVITAMIN OR), , Disp: , Rfl:      oxyCODONE (ROXICODONE) 5 MG tablet, Take 1 tablet (5 mg) by mouth every 6 hours as needed for severe pain (Patient not taking: Reported on 3/26/2021), Disp: 30 tablet, Rfl: 0     warfarin (COUMADIN) 7.5 MG tablet, Take 1 tablet by mouth daily., Disp: , Rfl:      Allergies:     Allergies   Allergen Reactions     Amoxicillin      Swelling - this reaction was at age 4. He was given amoxicillin as a prophylactic antibiotic before open heart surgery without a reaction in the 1980's and 2012        Melvin Cd [Cefaclor Monohydrate]      Rash       Erythromycin      Rash       Penicillins      Swelling          Family History: family history includes Heart Disease in his father and maternal grandfather.     Social History: [employer]  reports that he quit smoking about 9 years ago. He has never used smokeless tobacco. He reports current alcohol use. He reports that he does not use drugs.    Review of Systems:  ROS: 10 point ROS neg other than the symptoms noted above in the HPI and past  "medical history.    Physical Exam  GENERAL APPEARANCE: healthy, alert, no distress.   SKIN: no suspicious lesions or rashes  RESPIRATORY: No increased work of breathing.  NEURO: Normal strength and tone, mentation intact and speech normal  PSYCH:  mentation appears normal and affect normal. Not anxious.  Hands: no clubbing, nail pitting or nodes.    /83   Pulse 77   Ht 1.702 m (5' 7\")   Wt 72.9 kg (160 lb 12.8 oz)   SpO2 96%   BMI 25.18 kg/m         LEFT UPPER EXTREMITY:    Sensation intact to light touch in median, radial, ulnar and axillary nerve distributions  Palpable 2+ radial pulse, brisk capillary refill to all fingers, wwp  Intact epl fpl fdp edc wrist flexion/extension biceps triceps deltoid  DTR's normal biceps and brachioradialis  Tinels over median nerve at the wrist positive for \"pins and needles\" to the ring/small finger but negative for the radial digits  Median nerve compression test negative.  At times, almost \"postures\" with the left hand into a claw-type posture (states he has had that since about age 19)    ELBOW:  Skin intact. No open wounds. No skin maceration.  Inspection: no swelling, no ecchymosis, no olecranon bursa swelling, no distal bicep tendon defect  Tender: medial epicondyle, common flexor tendon and flexor muscle of forearm  Non-tender: lateral epicondyle, common extensor tendon, extensor muscle of forearm, supracondylar notch, olecranon bursa, distal bicep tendon and radial head/neck  Range of Motion: all normal  Strength: elbow strength full  Special tests: normal stability, ulnar Tinel's negative, no pain with resisted wrist extension, no pain with resisted middle finger extension, pain with resisted wrist flexion:   There is no gross deformity in the area.        X-rays:  2 views left elbow previously obtained 3/26/2021 were reviewed in clinic today.  Subchondral cystic change of the capitellum with mild marginal osteophyte formation in the radiocapitellar joint. This " "could be degenerative in nature or related to previous osteochondral injury. No acute fracture or joint effusion.        Assessment: 40 year old male , right -hand dominant, with chronic left elbow pain, medial epicondylitis; possible ulnar neuropathy    Plan:   * discussed with patient history and clinical exam consistent with \"golfer's\" elbow, or medial epicondylitis, which is tendonitis at the medial or inner aspect of the elbow. This is where the flexor tendons originate for the wrist.  * treatment is nonoperative, with surgical treatment reserved for recalcitrant symptoms despite long-term nonperative treatment regimen  * tensing of the muscles may be causing some pressure on the ulnar nerve, causing numbness and tingling.  * if this continues, would recommend EMG evaluation with neurology.    Treatment includes:  * rest  * activity modification - avoid aggravating activities and reduce strenuous activities for 6-8 weeks  * ice, ice massage  * Physical therapy, modalities as indicated including ultrasound, massage, iontophoresis  * counterforce elbow brace/strap, available OTC  - has done in past without relief.  * NDAIDS regularly three times daily x2-3 weeks  * cortisone injection discussed, placed at point of maximal tenderness if needed. He elects to proceed, see procedure note below.  * return to clinic as needed.  .      Jeremias Emerson M.D., M.S.  Dept. of Orthopaedic Surgery  [facility]      Hand / Upper Extremity Injection/Arthrocentesis: L elbow    Date/Time: 4/28/2021 10:57 AM  Performed by: Sebastian Nj MD  Authorized by: Sebastian Nj MD     Indications:  Pain  Needle Size:  25 G  Guidance: landmark    Approach:  Medial  Condition: epicondylitis, medial      Site:  L elbow  Medications:  40 mg triamcinolone 40 MG/ML  Outcome:  Tolerated well, no immediate complications  Procedure discussed: discussed risks, benefits, and alternatives    Timeout: timeout called immediately prior to " procedure    Prep: patient was prepped and draped in usual sterile fashion     1.0 CC Bupivacaine 0.25% NDC 59064-289-02, LOT [id number],  OCT 2021            Again, thank you for allowing me to participate in the care of your patient.        Sincerely,        Jeremias Emerson MD

## 2021-05-05 ENCOUNTER — THERAPY VISIT (OUTPATIENT)
Dept: PHYSICAL THERAPY | Facility: CLINIC | Age: 41
End: 2021-05-05
Payer: COMMERCIAL

## 2021-05-05 DIAGNOSIS — M54.2 NECK PAIN: ICD-10-CM

## 2021-05-05 PROCEDURE — 97140 MANUAL THERAPY 1/> REGIONS: CPT | Mod: GP | Performed by: PHYSICAL THERAPIST

## 2021-05-05 PROCEDURE — 97161 PT EVAL LOW COMPLEX 20 MIN: CPT | Mod: GP | Performed by: PHYSICAL THERAPIST

## 2021-05-05 PROCEDURE — 97110 THERAPEUTIC EXERCISES: CPT | Mod: GP | Performed by: PHYSICAL THERAPIST
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
## 2021-05-12 ENCOUNTER — THERAPY VISIT (OUTPATIENT)
Dept: PHYSICAL THERAPY | Facility: CLINIC | Age: 41
End: 2021-05-12
Payer: COMMERCIAL

## 2021-05-12 DIAGNOSIS — M54.2 NECK PAIN: ICD-10-CM

## 2021-05-12 PROCEDURE — 97110 THERAPEUTIC EXERCISES: CPT | Mod: GP | Performed by: PHYSICAL THERAPIST

## 2021-05-12 PROCEDURE — 97140 MANUAL THERAPY 1/> REGIONS: CPT | Mod: GP | Performed by: PHYSICAL THERAPIST

## 2021-05-20 ENCOUNTER — THERAPY VISIT (OUTPATIENT)
Dept: PHYSICAL THERAPY | Facility: CLINIC | Age: 41
End: 2021-05-20
Payer: COMMERCIAL

## 2021-05-20 DIAGNOSIS — M54.2 NECK PAIN: ICD-10-CM

## 2021-05-20 PROCEDURE — 97110 THERAPEUTIC EXERCISES: CPT | Mod: GP | Performed by: PHYSICAL THERAPIST

## 2021-05-20 PROCEDURE — 97140 MANUAL THERAPY 1/> REGIONS: CPT | Mod: GP | Performed by: PHYSICAL THERAPIST

## 2021-05-27 ENCOUNTER — THERAPY VISIT (OUTPATIENT)
Dept: PHYSICAL THERAPY | Facility: CLINIC | Age: 41
End: 2021-05-27
Payer: COMMERCIAL

## 2021-05-27 DIAGNOSIS — M54.2 NECK PAIN: ICD-10-CM

## 2021-05-27 PROCEDURE — 97140 MANUAL THERAPY 1/> REGIONS: CPT | Performed by: PHYSICAL THERAPIST

## 2021-05-27 PROCEDURE — 97112 NEUROMUSCULAR REEDUCATION: CPT | Performed by: PHYSICAL THERAPIST

## 2021-05-27 PROCEDURE — 97110 THERAPEUTIC EXERCISES: CPT | Performed by: PHYSICAL THERAPIST

## 2021-08-02 PROBLEM — M54.2 NECK PAIN: Status: RESOLVED | Noted: 2021-05-05 | Resolved: 2021-08-02

## 2021-08-02 NOTE — PROGRESS NOTES
Subjective:  HPI  Physical Exam                    Objective:  System    Physical Exam    General     ROS    Assessment/Plan:    DISCHARGE REPORT    Progress reporting period is from 5/5/2021 to 5/27/2021.     SUBJECTIVE  Subjective: Going well.  Wall push ups difficult.     Current Pain level: 2/10   Initial Pain level: 4/10        ;   ,     Patient has failed to return to therapy so current objective findings are unknown.  The subjective and objective information are from the last SOAP note on this patient.    OBJECTIVE  Objective: Normal motion with slight pain       ASSESSMENT/PLAN  Updated problem list and treatment plan:Diagnosis 1:  Neck pain  Pain -  manual therapy, self management, education, directional preference exercise and home program  Decreased ROM/flexibility - manual therapy and therapeutic exercise  Impaired muscle performance - neuro re-education  Decreased function - therapeutic activities     STG/LTGs have been met or progress has been made towards goals:  Yes (See Goal flow sheet completed today.)  Assessment of Progress: The patient has not returned to therapy. Current status is unknown.  Self Management Plans:  Patient has been instructed in a home treatment program.    Rd continues to require the following intervention to meet STG and LTG's: PT intervention is no longer required to meet STG/LTG.  The patient failed to complete scheduled/ordered appointments so current information is unknown.  We will discharge this patient from PT.    Recommendations:  Pt did not call to schedule additional visits.  Discharge pt from PT.      Please refer to the daily flowsheet for treatment today, total treatment time and time spent performing 1:1 timed codes.

## 2021-09-19 ENCOUNTER — HEALTH MAINTENANCE LETTER (OUTPATIENT)
Age: 41
End: 2021-09-19

## 2021-11-14 ENCOUNTER — HEALTH MAINTENANCE LETTER (OUTPATIENT)
Age: 41
End: 2021-11-14

## 2021-12-17 ENCOUNTER — VIRTUAL VISIT (OUTPATIENT)
Dept: FAMILY MEDICINE | Facility: CLINIC | Age: 41
End: 2021-12-17
Payer: COMMERCIAL

## 2021-12-17 ENCOUNTER — LAB (OUTPATIENT)
Dept: FAMILY MEDICINE | Facility: CLINIC | Age: 41
End: 2021-12-17
Attending: FAMILY MEDICINE
Payer: COMMERCIAL

## 2021-12-17 DIAGNOSIS — Z20.822 EXPOSURE TO 2019 NOVEL CORONAVIRUS: ICD-10-CM

## 2021-12-17 DIAGNOSIS — Z20.822 EXPOSURE TO 2019 NOVEL CORONAVIRUS: Primary | ICD-10-CM

## 2021-12-17 DIAGNOSIS — R50.9 FEVER, UNSPECIFIED FEVER CAUSE: ICD-10-CM

## 2021-12-17 DIAGNOSIS — M79.10 MYALGIA: ICD-10-CM

## 2021-12-17 PROCEDURE — U0005 INFEC AGEN DETEC AMPLI PROBE: HCPCS

## 2021-12-17 PROCEDURE — U0003 INFECTIOUS AGENT DETECTION BY NUCLEIC ACID (DNA OR RNA); SEVERE ACUTE RESPIRATORY SYNDROME CORONAVIRUS 2 (SARS-COV-2) (CORONAVIRUS DISEASE [COVID-19]), AMPLIFIED PROBE TECHNIQUE, MAKING USE OF HIGH THROUGHPUT TECHNOLOGIES AS DESCRIBED BY CMS-2020-01-R: HCPCS

## 2021-12-17 PROCEDURE — 99213 OFFICE O/P EST LOW 20 MIN: CPT | Mod: 95 | Performed by: FAMILY MEDICINE

## 2021-12-17 PROCEDURE — 99207 PR NO CHARGE LOS: CPT

## 2021-12-17 NOTE — PROGRESS NOTES
Rd is a 41 year old who is being evaluated via a billable telephone visit.      What phone number would you like to be contacted at? [phone]  How would you like to obtain your AVS? MyChart    Assessment & Plan       ICD-10-CM    1. Exposure to 2019 novel coronavirus  Z20.822 Symptomatic; Yes; 12/15/2021 COVID-19 Virus (Coronavirus) by PCR   2. Fever, unspecified fever cause  R50.9 Symptomatic; Yes; 12/15/2021 COVID-19 Virus (Coronavirus) by PCR   3. Myalgia  M79.10 Symptomatic; Yes; 12/15/2021 COVID-19 Virus (Coronavirus) by PCR   Patient was referred for testing.       Return in about 5 days (around 12/22/2021) for Follow-up visit-  if symptoms failed to improve, Video, Telephone or E-visit.    Terrell Vigil MD  [facility]   Rd is a 41 year old who presents for the following health issues     HPI     Concern for COVID-19  About how many days ago did these symptoms start? x2 days  Is this your first visit for this illness? Yes  In the 14 days before your symptoms started, have you had close contact with someone with COVID-19 (Coronavirus)? No  Do you have a fever or chills? Yes, the highest temperature was 102  Are you having new or worsening difficulty breathing? No  Do you have new or worsening cough? Yes, I am coughing up mucus.  Have you had any new or unexplained body aches? YES    Have you experienced any of the following NEW symptoms? Rhinorrhea, weakness/fatigue    Headache: No    Sore throat: YES    Loss of taste or smell: YES    Chest pain: No    Diarrhea: YES    Rash: No  What treatments have you tried? Vitamin C, soup - somewhat helpful   Who do you live with? Girlfriend, daughter  Are you, or a household member, a healthcare worker or a ? No  Do you live in a nursing home, group home, or shelter? No  Do you have a way to get food/medications if quarantined? Yes, I have a friend or family member who can help me.    Review of Systems    Constitutional, HEENT, cardiovascular, pulmonary, gi and gu systems are negative, except as otherwise noted.      Objective           Vitals:  No vitals were obtained today due to virtual visit.    Physical Exam   healthy and no distress  PSYCH: Alert and oriented times 3; coherent speech, normal   rate and volume, able to articulate logical thoughts, able   to abstract reason, no tangential thoughts, no hallucinations   or delusions  His affect is normal  RESP: No cough, no audible wheezing, able to talk in full sentences  Remainder of exam unable to be completed due to telephone visits    No results found for any visits on 12/17/21.        Phone call duration: 6 minutes

## 2021-12-18 LAB — SARS-COV-2 RNA RESP QL NAA+PROBE: POSITIVE

## 2022-11-21 ENCOUNTER — HEALTH MAINTENANCE LETTER (OUTPATIENT)
Age: 42
End: 2022-11-21

## 2023-02-02 ENCOUNTER — OFFICE VISIT (OUTPATIENT)
Dept: URGENT CARE | Facility: URGENT CARE | Age: 43
End: 2023-02-02
Payer: COMMERCIAL

## 2023-02-02 VITALS
SYSTOLIC BLOOD PRESSURE: 131 MMHG | OXYGEN SATURATION: 99 % | DIASTOLIC BLOOD PRESSURE: 86 MMHG | TEMPERATURE: 97.8 F | WEIGHT: 163 LBS | BODY MASS INDEX: 25.53 KG/M2 | HEART RATE: 66 BPM | RESPIRATION RATE: 12 BRPM

## 2023-02-02 DIAGNOSIS — Z79.01 ANTICOAGULATED: ICD-10-CM

## 2023-02-02 DIAGNOSIS — L02.91 ABSCESS: Primary | ICD-10-CM

## 2023-02-02 PROCEDURE — 99214 OFFICE O/P EST MOD 30 MIN: CPT | Performed by: NURSE PRACTITIONER

## 2023-02-02 RX ORDER — SULFAMETHOXAZOLE/TRIMETHOPRIM 800-160 MG
1 TABLET ORAL 2 TIMES DAILY
Qty: 14 TABLET | Refills: 0 | Status: SHIPPED | OUTPATIENT
Start: 2023-02-02 | End: 2023-02-09

## 2023-02-02 NOTE — PROGRESS NOTES
Assessment & Plan     Abscess    - sulfamethoxazole-trimethoprim (BACTRIM DS) 800-160 MG tablet  Dispense: 14 tablet; Refill: 0    Anticoagulated       Incision and drainage not indicated currently without fluctuance. Prescription sent to pharmacy for Bactrim twice daily for 7 days which can affect INR. Contact INR clinic to discuss any warfarin dose adjustments or change in recheck date. Keep skin clean and dry, avoid shaving or picking. Watch closely for worsening symptoms of infection including fever, chills, redness, swelling, pain, purulent discharge and follow-up right away if develops. Watch closely for any other bleeding or bruising due to abx and warfarin interaction and follow-up right away if develops.     Follow-up with PCP if symptoms persist for 5 days, and sooner if symptoms worsen or new symptoms develop.     Discussed red flag symptoms which warrant immediate visit in emergency room    All questions were answered and patient verbalized understanding. AVS reviewed with patient.     Kanika Granados, DNP, APRN, CNP 2/2/2023 6:03 PM  [facility] [place]        Tang Sultana is a 42 year old male who presents to clinic today for the following health issues:  Chief Complaint   Patient presents with     Derm Problem     Sore on upper left thigh- looks red/infected     Patient presents for evaluation of red sore on his left thigh since yesterday which has been worsening. He developed yellow pus-like bloody drainage, swelling, tenderness, firm lump. Pain is mild at rest and severe with touching. No history of abscess. He trims his legs with beard guillermina every couple weeks due to excessive hair and wonders if he developed an ingrown hair?     He applied neosporin and rubbing alcohol which seemed to help a little bit after it drained. 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 He checks his INR every 3-6 weeks, just had done today at 1.9 and was advised to  recheck in 4 weeks. No history of MRSA.     Problem list, Medication list, Allergies, and Medical history reviewed in EPIC.    ROS:  Review of systems negative except for noted above        Objective    /86   Pulse 66   Temp 97.8  F (36.6  C) (Tympanic)   Resp 12   Wt 73.9 kg (163 lb)   SpO2 99%   BMI 25.53 kg/m    Physical Exam  Constitutional:       General: He is not in acute distress.     Appearance: He is not toxic-appearing or diaphoretic.   Skin:     General: Skin is warm and dry.      Comments: Left groin abscess 1 cm firm with mild swelling, no increased warmth or drainage. Mild erythema 1 cm   Neurological:      Mental Status: He is alert.

## 2023-07-31 ENCOUNTER — VIRTUAL VISIT (OUTPATIENT)
Dept: INTERNAL MEDICINE | Facility: CLINIC | Age: 43
End: 2023-07-31
Payer: COMMERCIAL

## 2023-07-31 DIAGNOSIS — M25.50 CHRONIC JOINT PAIN: ICD-10-CM

## 2023-07-31 DIAGNOSIS — G89.29 CHRONIC JOINT PAIN: ICD-10-CM

## 2023-07-31 DIAGNOSIS — F43.9 STRESS: ICD-10-CM

## 2023-07-31 DIAGNOSIS — F41.9 ANXIETY: Primary | ICD-10-CM

## 2023-07-31 DIAGNOSIS — Z79.899 MEDICAL MARIJUANA USE: ICD-10-CM

## 2023-07-31 PROCEDURE — 99214 OFFICE O/P EST MOD 30 MIN: CPT | Mod: VID | Performed by: INTERNAL MEDICINE

## 2023-07-31 RX ORDER — BUPROPION HYDROCHLORIDE 150 MG/1
150 TABLET ORAL EVERY MORNING
Qty: 30 TABLET | Refills: 2 | Status: SHIPPED | OUTPATIENT
Start: 2023-07-31 | End: 2023-11-06

## 2023-07-31 ASSESSMENT — ANXIETY QUESTIONNAIRES
GAD7 TOTAL SCORE: 12
5. BEING SO RESTLESS THAT IT IS HARD TO SIT STILL: MORE THAN HALF THE DAYS
GAD7 TOTAL SCORE: 12
3. WORRYING TOO MUCH ABOUT DIFFERENT THINGS: MORE THAN HALF THE DAYS
1. FEELING NERVOUS, ANXIOUS, OR ON EDGE: MORE THAN HALF THE DAYS
4. TROUBLE RELAXING: MORE THAN HALF THE DAYS
7. FEELING AFRAID AS IF SOMETHING AWFUL MIGHT HAPPEN: MORE THAN HALF THE DAYS
2. NOT BEING ABLE TO STOP OR CONTROL WORRYING: NOT AT ALL
8. IF YOU CHECKED OFF ANY PROBLEMS, HOW DIFFICULT HAVE THESE MADE IT FOR YOU TO DO YOUR WORK, TAKE CARE OF THINGS AT HOME, OR GET ALONG WITH OTHER PEOPLE?: SOMEWHAT DIFFICULT
6. BECOMING EASILY ANNOYED OR IRRITABLE: MORE THAN HALF THE DAYS
7. FEELING AFRAID AS IF SOMETHING AWFUL MIGHT HAPPEN: MORE THAN HALF THE DAYS
IF YOU CHECKED OFF ANY PROBLEMS ON THIS QUESTIONNAIRE, HOW DIFFICULT HAVE THESE PROBLEMS MADE IT FOR YOU TO DO YOUR WORK, TAKE CARE OF THINGS AT HOME, OR GET ALONG WITH OTHER PEOPLE: SOMEWHAT DIFFICULT
GAD7 TOTAL SCORE: 12

## 2023-07-31 ASSESSMENT — PATIENT HEALTH QUESTIONNAIRE - PHQ9
SUM OF ALL RESPONSES TO PHQ QUESTIONS 1-9: 13
10. IF YOU CHECKED OFF ANY PROBLEMS, HOW DIFFICULT HAVE THESE PROBLEMS MADE IT FOR YOU TO DO YOUR WORK, TAKE CARE OF THINGS AT HOME, OR GET ALONG WITH OTHER PEOPLE: VERY DIFFICULT
SUM OF ALL RESPONSES TO PHQ QUESTIONS 1-9: 13

## 2023-07-31 NOTE — PROGRESS NOTES
"Rd is a 42 year old who is being evaluated via a billable video visit.      How would you like to obtain your AVS? Myrat  If the video visit is dropped, the invitation should be resent by: Text to cell phone: [phone]  Will anyone else be joining your video visit? No      5:46 PM   6:12 PM     Assessment & Plan     Anxiety  This is a patient I have met a few other times thank you, last appointment roughly 2.5 years ago. For today he is at his wits end as he faces a life that is filled with stress from financial hardship and anxiety issues stemming from his daughters illness and he's found very significant life upheaval. There is definitely enough evidence here for a anxiety disorder and possibly depression as well. I feel Wellbutrin (Bupropion Hcl) would be a good starting medication for him, and possibly when we reconvene to assess progress in one month we may a] increase this dose to 300 milligrams 1 time per day and then b] we may also want to consider adding a trial of Desyrel (Trazodone) 50 or a 100 milligrams  - buPROPion (WELLBUTRIN XL) 150 MG 24 hr tablet; Take 1 tablet (150 mg) by mouth every morning    Stress  See as detailed below. Patient is hanging in there but things are tough right now and he is clearly under a lot of stress    - REVIEW OF HEALTH MAINTENANCE PROTOCOL ORDERS    Chronic joint pain  He has a qualifying condition for Medical Marijuana  and he's using CBD gummies already as well as CBD creams and wishes to be \" legal\". I pointed out that with the current [place] marijuana situation it's legal now in 1 more day but he still wishes I go forwards with listing him with the Office of Medical Cannabis registry which is something I can and will do today      Medical marijuana use  See patient after-visit summary with Office of Medical Cannabis website address [email or web address]      Prescription drug management  26 minutes spent by me on the date " of the encounter doing chart review, history and exam, documentation and further activities per the note       Depression Screening Follow Up        7/31/2023     3:57 PM   PHQ   PHQ-9 Total Score 13   Q9: Thoughts of better off dead/self-harm past 2 weeks Not at all         7/31/2023     3:57 PM   Last PHQ-9   1.  Little interest or pleasure in doing things 1   2.  Feeling down, depressed, or hopeless 1   3.  Trouble falling or staying asleep, or sleeping too much 2   4.  Feeling tired or having little energy 2   5.  Poor appetite or overeating 1   6.  Feeling bad about yourself 2   7.  Trouble concentrating 2   8.  Moving slowly or restless 2   Q9: Thoughts of better off dead/self-harm past 2 weeks 0   PHQ-9 Total Score 13       Follow Up Actions Taken  Crisis resource information provided in After Visit Summary       Sebastian Nj MD  [facility] FREDIS Sultana is a 42 year old, presenting for the following health issues:  Stress         No data to display                History of Present Illness       Mental Health Follow-up:  Patient presents to follow-up on Depression & Anxiety.Patient's depression since last visit has been:  No change  The patient is not having other symptoms associated with depression.  Patient's anxiety since last visit has been:  No change  The patient is not having other symptoms associated with anxiety.  Any significant life events: job concerns, financial concerns and housing concerns  Patient is feeling anxious or having panic attacks.  Patient has no concerns about alcohol or drug use.    Reason for visit:  I would like try an anti anxiety medication to try to co.bat my symptoms.  I would also like to request a medical Marijuana card so i can get cbd lotions for my achy elbows and knees.j  Symptom onset:  More than a month  Symptoms include:  Poor appetite,  restless slerp, mood swings  Symptom intensity:  Moderate  Symptom progression:  Worsening  Had  these symptoms before:  Yes  Has tried/received treatment for these symptoms:  No  What makes it worse:  Emotions are hightened when im experiencing physical exhaustion.  What makes it better:  Lots of rest and relaxation    He eats 2-3 servings of fruits and vegetables daily.He consumes 1 sweetened beverage(s) daily.He exercises with enough effort to increase his heart rate 10 to 19 minutes per day.  He exercises with enough effort to increase his heart rate 5 days per week. He is missing 1 dose(s) of medications per week.  He is not taking prescribed medications regularly due to remembering to take.     Daughter with a stressful illness secondary to anxiety - is 19, separate from the mother and daughter . She is living to [facility] and doing well and spends   She stopped walking secondary to pain - July 2022 and had a large program at [place] and [employer] still coming - [facility] - it's 500 a month and patient has marked restrictions on how he can get by living pay check to pay check - 3.5 more years     He had to change working to accommodate her needs, she has made a full recovery , going to college - current relationship going very well. 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  With his job he's had greatly increased responsibility   This has led the mood swings  Especially last couple of weeks  Had nervous breakdowns  Can't sleep  Work on mind essentially all of the time   Coping with physical symptoms of anxiety including rapid heart action  , shortness of breath   Can't sleep any longer then 5 hours   Hard to concentrate    Not over-using alcohol   He drinks socially and is on blood thinners so he generally has nothing more then 1-2 beers at a time maybe slightly  recognize  Denies use of illicit substances - at times ttakes 10 milligram CBD gummies and CBD creams      [email or web address]  [email or web address]          Review of Systems   Constitutional, HEENT, cardiovascular, pulmonary, gi and gu systems are negative, except as otherwise noted.      Objective           Vitals:  No vitals were obtained today due to virtual visit.    Physical Exam   GENERAL: Healthy, alert and no distress  EYES: Eyes grossly normal to inspection.  No discharge or erythema, or obvious scleral/conjunctival abnormalities.  RESP: No audible wheeze, cough, or visible cyanosis.  No visible retractions or increased work of breathing.    SKIN: Visible skin clear. No significant rash, abnormal pigmentation or lesions.  NEURO: Cranial nerves grossly intact.  Mentation and speech appropriate for age.  PSYCH: Mentation appears normal, affect normal/bright, judgement and insight intact, normal speech and appearance well-groomed.        Video-Visit Details    Type of service:  Video Visit   Video Start Time:  5:46 pm  Video End Time: 6:12 pm    Originating Location (pt. Location): Home    Distant Location (provider location):  On-site  Platform used for Video Visit: Blake

## 2023-07-31 NOTE — PATIENT INSTRUCTIONS
[email or web address]      This is the Office of Medical Cannabis website address

## 2023-11-05 DIAGNOSIS — F41.9 ANXIETY: ICD-10-CM

## 2023-11-06 RX ORDER — BUPROPION HYDROCHLORIDE 150 MG/1
150 TABLET ORAL EVERY MORNING
Qty: 30 TABLET | Refills: 2 | Status: SHIPPED | OUTPATIENT
Start: 2023-11-06 | End: 2024-02-05

## 2023-11-25 ENCOUNTER — HEALTH MAINTENANCE LETTER (OUTPATIENT)
Age: 43
End: 2023-11-25

## 2024-02-05 DIAGNOSIS — F41.9 ANXIETY: ICD-10-CM

## 2024-02-05 RX ORDER — BUPROPION HYDROCHLORIDE 150 MG/1
150 TABLET ORAL EVERY MORNING
Qty: 30 TABLET | Refills: 2 | Status: SHIPPED | OUTPATIENT
Start: 2024-02-05 | End: 2024-04-16

## 2024-04-14 DIAGNOSIS — F41.9 ANXIETY: ICD-10-CM

## 2024-04-16 RX ORDER — BUPROPION HYDROCHLORIDE 150 MG/1
150 TABLET ORAL EVERY MORNING
Qty: 30 TABLET | Refills: 2 | Status: SHIPPED | OUTPATIENT
Start: 2024-04-16 | End: 2024-06-17

## 2024-06-17 ENCOUNTER — VIRTUAL VISIT (OUTPATIENT)
Dept: INTERNAL MEDICINE | Facility: CLINIC | Age: 44
End: 2024-06-17
Payer: COMMERCIAL

## 2024-06-17 DIAGNOSIS — R45.86 BRIEF COMPENSATORY MOOD SWINGS: Primary | ICD-10-CM

## 2024-06-17 DIAGNOSIS — F41.9 ANXIETY: ICD-10-CM

## 2024-06-17 PROCEDURE — 99214 OFFICE O/P EST MOD 30 MIN: CPT | Mod: 95 | Performed by: INTERNAL MEDICINE
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
## 2024-07-28 DIAGNOSIS — R45.86 BRIEF COMPENSATORY MOOD SWINGS: ICD-10-CM

## 2024-07-28 DIAGNOSIS — F41.9 ANXIETY: ICD-10-CM

## 2024-09-25 NOTE — PROGRESS NOTES
SUBJECTIVE:   CC: Rd Rodarte is an 40 year old male who presents for preventative health visit.     Healthy Habits:    Getting at least 3 servings of Calcium per day:  Yes    Bi-annual eye exam:  NO    Dental care twice a year:  Yes    Sleep apnea or symptoms of sleep apnea:  None    Diet:  Regular (no restrictions)    Frequency of exercise:  2-3 days/week    Duration of exercise:  30-45 minutes    Taking medications regularly:  Yes    Barriers to taking medications:  Not applicable    Medication side effects:  Not applicable    PHQ-2 Total Score:    Additional concerns today:  Yes  Numbness   Associated symptoms include numbness.   Mass   Associated symptoms include numbness.     Ability to successfully perform activities of daily living: Yes, no assistance needed  Home safety:  none identified   Hearing impairment: no     Today's PHQ-2 Score:   PHQ-2 (  [employer]) 2017   Q1: Little interest or pleasure in doing things 0   Q2: Feeling down, depressed or hopeless 0   PHQ-2 Score 0       Abuse: Current or Past(Physical, Sexual or Emotional)- No  Do you feel safe in your environment? Yes        Social History     Tobacco Use     Smoking status: Former Smoker     Last attempt to quit: 2011     Years since quittin.7     Smokeless tobacco: Never Used   Substance Use Topics     Alcohol use: Yes     If you drink alcohol do you typically have >3 drinks per day or >7 drinks per week? No    No flowsheet data found.No flowsheet data found.    Last PSA: No results found for: PSA    Reviewed orders with patient. Reviewed health maintenance and updated orders accordingly - Yes  Lab work is in process  Labs reviewed in EPIC  BP Readings from Last 3 Encounters:   20 106/74   17 120/80   01/12/15 124/78    Wt Readings from Last 3 Encounters:   20 73 kg (161 lb)   17 78.6 kg (173 lb 3.2 oz)   01/12/15 77 kg (169 lb 12.8 oz)                  Patient Active Problem List   Diagnosis     History  of aortic valve repair in , AVR in  and 2nd AVR in      Smoking     Chronic anticoagulation     Prophylactic antibiotic     Chest wall pain     Congestive heart failure (H)     CARDIOVASCULAR SCREENING; LDL GOAL LESS THAN 100     Anisocoria     Embolic stroke involving right cerebral artery,      Closed Colles' fracture of left radius with malunion     Past Surgical History:   Procedure Laterality Date     REDO STERNOTOMY REPAIR VALVE AORTIC  1989    age of 8, had a medtronic-Patel 23 mm valve     REDO STERNOTOMY REPLACE VALVE AORTIC  2011    at [facility], 3rd time redo sternotomy     REPAIR VALVE AORTIC CHILD      age of 3, first surgery for congenital bicuspid aortic valve     WRIST SURGERY  1999    ORIF of a fracture       Social History     Tobacco Use     Smoking status: Former Smoker     Last attempt to quit: 2011     Years since quittin.7     Smokeless tobacco: Never Used   Substance Use Topics     Alcohol use: Yes     Family History   Problem Relation Age of Onset     Heart Disease Father              Heart Disease Maternal Grandfather          Current Outpatient Medications   Medication Sig Dispense Refill     Multiple Vitamins-Minerals (MULTIVITAMIN OR)        warfarin (COUMADIN) 7.5 MG tablet Take 1 tablet by mouth daily.       Allergies   Allergen Reactions     Amoxicillin      Swelling - this reaction was at age 4. He was given amoxicillin as a prophylactic antibiotic before open heart surgery without a reaction in the 's and         Ceclor Cd [Cefaclor Monohydrate]      Rash       Erythromycin      Rash       Penicillins      Swelling       Recent Labs   Lab Test 01/12/15  1219 14  1503   ALT  --  38   CR 1.84* 0.72   GFRESTIMATED 42* >90   GFRESTBLACK 51* >90   POTASSIUM 3.9 4.0        Reviewed and updated as needed this visit by clinical staff  Tobacco  Allergies  Meds  Med Hx  Surg Hx  Fam Hx  Soc Hx        Reviewed and  updated as needed this visit by Provider            Review of Systems   Neurological: Positive for numbness.     CONSTITUTIONAL: NEGATIVE for fever, chills, change in weight  INTEGUMENTARY/SKIN: NEGATIVE for worrisome rashes, moles or lesions  EYES: NEGATIVE for vision changes or irritation  ENT: NEGATIVE for ear, mouth and throat problems  RESP: NEGATIVE for significant cough or SOB  CV: NEGATIVE for chest pain, palpitations or peripheral edema  GI: NEGATIVE for nausea, abdominal pain, heartburn, or change in bowel habits   male: negative for dysuria, hematuria, decreased urinary stream, erectile dysfunction, urethral discharge  MUSCULOSKELETAL: NEGATIVE for significant arthralgias or myalgia  NEURO: NEGATIVE for weakness, dizziness or paresthesias  PSYCHIATRIC: NEGATIVE for changes in mood or affect    OBJECTIVE:   /74   Pulse 75   Temp 97.9  F (36.6  C) (Oral)   Resp 16   Wt 73 kg (161 lb)   SpO2 99%   BMI 24.48 kg/m      Physical Exam  GENERAL: healthy, alert and no distress  EYES: Eyes grossly normal to inspection, PERRL and conjunctivae and sclerae normal  HENT: ear canals with bilateral ear wax impactions can't see tympanic membranes, nose and mouth without ulcers or lesions  NECK: no adenopathy, no asymmetry, masses, or scars and thyroid normal to palpation  RESP: lungs clear to auscultation - no rales, rhonchi or wheezes  CV: regular rate and rhythm, normal S1 S2, no S3 or S4, 1-2 + systolic ejection murmur, click or rub, no peripheral edema and peripheral pulses strong  ABDOMEN: soft, nontender, no hepatosplenomegaly, no masses and bowel sounds normal  MS: no gross musculoskeletal defects noted, no edema, currently arm exams are negative for outright weakness or findings of spinal cord compromise - see as detailed below   SKIN: no suspicious lesions or rashes  NEURO: Normal strength and tone, mentation intact and speech normal  PSYCH: mentation appears normal, affect  normal/bright    Diagnostic Test Results:  Labs reviewed in Epic    ASSESSMENT/PLAN:   (Z00.00) Routine history and physical examination of adult  (primary encounter diagnosis)  Comment: we covered quite a few  Topics today   Plan: see orders section of this encounter , one thing we forgot to address today was we noted bilateral ear wax impactions and offered to irrigate these clean but patient left prior to getting this done    (Z83.71) Family history of colonic polyps  Comment: patients brother at 41 years old developed colon polyps and there's a suggested family history here that places patient at increased risk of colon cancer. I don't think is a defined genetic high risk situation such as with Sosa syndrome, also known as familial colorectal polyposis or other disease but an earlier start for colonoscopy is appropriate   Plan: GASTROENTEROLOGY ADULT REF PROCEDURE ONLY            (Z23) Need for prophylactic vaccination and inoculation against influenza  Comment: administered   Plan: INFLUENZA VACCINE IM > 6 MONTHS VALENT IIV4         [40769], Vaccine Administration, Initial         [85320]            (Z13.29) Screening for hypothyroidism  Comment: routine screening   Plan: TSH with free T4 reflex        Follow up as indicated on results     (Z11.4) Encounter for screening for HIV  Comment: routine screening   Plan: HIV Antigen Antibody Combo        Follow up as indicated on results     (Z98.890,  Z86.79) History of aortic valve repair in 1984, AVR in 1989 and 2nd AVR in 2012  Comment: routine screening   Plan: ALT, Basic metabolic panel, CBC with platelets         differential        Follow up     (I63.40) Embolic stroke involving right cerebral artery, 1998  Comment: significant past medical history without neurological symptoms residual  Plan: ALT, Basic metabolic panel, CBC with platelets         differential            (Z13.6) CARDIOVASCULAR SCREENING; LDL GOAL LESS THAN 100  Comment: routine screening  "  Plan: Lipid panel reflex to direct LDL Fasting            (M54.12) Cervical radiculopathy  Comment: he has a separate history of over last few months ever since working at a [employer] and after a fall where he feels he hurt his neck, he now has significant bilateral hand wrist and forearm numbness symptoms every single morning with rising at it takes 30-45 minutes before he can \" shake this al out\", I did not find evidence on exam of outright worries of spinal cord however this history is suggestive of a form of cervical spine pressure and this is a potential serious situation. A cervical spine MRI study is an appropriate step in this care with possibility of breast reduction surgery opinion depending on the test results   Plan: MR Cervical Spine w/o Contrast          Finally he says he is due for a repeat echocardiogram regarding his aortic valve replacement. He plans to have this completely in the near future , this was already ordered by his cardiologist so I await results to review. Please let me know if any help is necessary with getting this test done      COUNSELING:   Reviewed preventive health counseling, as reflected in patient instructions    Estimated body mass index is 26.33 kg/m  as calculated from the following:    Height as of 1/12/15: 1.727 m (5' 8\").    Weight as of 6/22/17: 78.6 kg (173 lb 3.2 oz).         He reports that he quit smoking about 8 years ago. He has never used smokeless tobacco.      Counseling Resources:  ATP IV Guidelines  Pooled Cohorts Equation Calculator  FRAX Risk Assessment  ICSI Preventive Guidelines  Dietary Guidelines for Americans, 2010  USDA's MyPlate  ASA Prophylaxis  Lung CA Screening    Sebastian Nj MD  [facility]  " You were seen in the ED for hypertension.    Take amlodipine as prescribed.    Follow up with your primary doctor.    Follow up with neurology for chiari malformation.    Hypertension, commonly called high blood pressure, is when the force of blood pumping through your arteries is too strong. Hypertension forces your heart to work harder to pump blood. Your arteries may become narrow or stiff. Having untreated or uncontrolled hypertension for a long period of time can cause heart attack, stroke, kidney disease, and other problems. If started on a medication, take exactly as prescribed by your health care professional. Maintain a healthy lifestyle and follow up with your primary care physician.    SEEK IMMEDIATE MEDICAL CARE IF YOU HAVE ANY OF THE FOLLOWING SYMPTOMS: severe headache, confusion, chest pain, abdominal pain, vomiting, or shortness of breath.    ¿Qué es la presión arterial brenda?    La presión arterial brenda es un padecimiento que lo pone en riesgo de sufrir un infarto, un accidente cerebrovascular (derrame) y michael enfermedad renal. Por lo general no causa síntomas, juanis puede ser grave.    Cuando el médico o enfermero le dice cuál es varela presión arterial, menciona dos números. Por ejemplo, podría informarle que varela presión arterial es "130 sobre 80". El número superior es la presión dentro de las arterias cuando el corazón se contrae. El número inferior es la presión dentro de las arterias cuando el corazón se relaja.    "Presión arterial elevada" es un término que los médicos o enfermeros utilizan cody advertencia. Las personas con presión arterial elevada aún no tienen presión arterial brenda. Sin embargo, varela presión arterial no es greene baja cody debería ser para tener buena lorie.    Muchos expertos definen la presión arterial brenda, elevada y normal de la siguiente manera:    ?[place] – El número superior es 130 o mayor y/o el número inferior es 80 o mayor.    ?[place] – El número superior está entre 120 y 129 y el número inferior es 79 o abel.    ?Normal – El número superior es 119 o abel y el número inferior es 79 o abel.    Esta información también figura en la tabla (tabla 1).    ¿Cómo puedo bajar mi presión arterial?    Si varela médico o enfermero le recetó medicina para la presión arterial, lo más importante que puede hacer es tomarla. Si le causa efectos secundarios, no deje de tomarla. En cambio, hable con varela médico o enfermero sobre los problemas que le causa. Podría cambiarle la medicina o reducirle la dosis. Si el precio es un problema para usted, también menciónelo. Es posible que pueda recetarle michael medicina menos costosa. Matthew varela medicina para la presión arterial puede prevenir un infarto o un accidente cerebrovascular (derrame), ¡y puede salvar varela linda!    ¿Hay algo que pueda hacer por mi cuenta?    Usted tiene mucho control sobre varela presión arterial. Para bajarla:    ?Baja de peso (si tiene sobrepeso).    ?Elija michael dieta baja en grasas y lizett en frutas, verduras y productos lácteos descremados.    ?Consuma menos ana lilia.    ?Jakub actividad física al menos 30 minutos al día, nathanael todos los días de la semana.    ?Tigist menos alcohol (si monica más de 2 bebidas alcohólicas por día).    También es michael buena idea adquirir un medidor hogareño de la presión arterial. Las personas que miden varela presión arterial en casa suelen controlarla mejor e incluso a veces pueden reducir la dosis de medicina que ja.

## 2024-12-16 ENCOUNTER — TELEPHONE (OUTPATIENT)
Dept: FAMILY MEDICINE | Facility: CLINIC | Age: 44
End: 2024-12-16
Payer: COMMERCIAL

## 2024-12-16 NOTE — TELEPHONE ENCOUNTER
Patient Quality Outreach    Patient is due for the following:   Colon Cancer Screening  Physical Preventive Adult Physical    Action(s) Taken:   Schedule a Adult Preventative    Type of outreach:    Sent [employer] message.    Questions for provider review:    None           Brittny Leo CMA  Chart routed to none.

## 2025-01-04 ENCOUNTER — HEALTH MAINTENANCE LETTER (OUTPATIENT)
Age: 45
End: 2025-01-04

## 2025-03-20 ENCOUNTER — OFFICE VISIT (OUTPATIENT)
Dept: URGENT CARE | Facility: URGENT CARE | Age: 45
End: 2025-03-20

## 2025-03-20 VITALS
HEART RATE: 93 BPM | DIASTOLIC BLOOD PRESSURE: 86 MMHG | RESPIRATION RATE: 22 BRPM | TEMPERATURE: 98.7 F | OXYGEN SATURATION: 97 % | SYSTOLIC BLOOD PRESSURE: 133 MMHG | BODY MASS INDEX: 25.84 KG/M2 | WEIGHT: 165 LBS

## 2025-03-20 DIAGNOSIS — Z79.01 CHRONIC ANTICOAGULATION: ICD-10-CM

## 2025-03-20 DIAGNOSIS — L03.317 ABSCESS AND CELLULITIS OF GLUTEAL REGION: Primary | ICD-10-CM

## 2025-03-20 DIAGNOSIS — L02.31 ABSCESS AND CELLULITIS OF GLUTEAL REGION: Primary | ICD-10-CM

## 2025-03-20 RX ORDER — SULFAMETHOXAZOLE AND TRIMETHOPRIM 800; 160 MG/1; MG/1
1 TABLET ORAL 2 TIMES DAILY
Qty: 20 TABLET | Refills: 0 | Status: SHIPPED | OUTPATIENT
Start: 2025-03-20 | End: 2025-03-30

## 2025-03-20 NOTE — PROGRESS NOTES
Assessment & Plan     Abscess and cellulitis of gluteal region  Suspect abscess drained, surrounding erythema and induration persistent. Treat with bactrim twice daily for 10 days. Be seen if symptoms worsen.  - sulfamethoxazole-trimethoprim (BACTRIM DS) 800-160 MG tablet; Take 1 tablet by mouth 2 times daily for 10 days.    Chronic anticoagulation  Adjust Warfarin from 7.5mg to 5mg daily. Check INR on Tuesday, and adjust as needed.      Return in about 1 week (around 3/27/2025) for visit with primary care provider if not improving, sooner fi worsening.     Kirsten Lanza PA-C  [facility]    Subjective   Rd Rodarte is a 44 year old who presents for the following health issues     Patient presents with:  Urgent Care: Hemorrhoids discomfort for 6 weeks, but has become worse in the last 2 days.       HPI    Rd presents to urgent care for evaluation of right gluteal pain/mass  Had a fistula removed about 10 years ago  Noticed a swollen area about 6 weeks ago  Cleaning with baby wipes and using prep H  Swelling is worsening  In the last couple days, had had wet pus, sometimes pink leaking from this area   Stools have been normal, no difficulty passing stool, painful to clean afterward  Pain is constant, 6-7/10    Review of Systems   ROS negative except as stated above.      Objective    /86   Pulse 93   Temp 98.7  F (37.1  C) (Tympanic)   Resp 22   Wt 74.8 kg (165 lb)   SpO2 97%   BMI 25.84 kg/m    Physical Exam   GENERAL: alert and no distress  RECTAL (male): normal sphincter tone, no rectal masses, prostate normal size, smooth, nontender without nodules or masses  Right gluteus neisha with erythematous indurated area with central puncture. No area fluctuance or with firm edges that feels it could be drained.     No results found for any visits on 03/20/25.

## 2025-03-20 NOTE — PATIENT INSTRUCTIONS
Antibiotics as directed-Bactrim twice daily for 10 days  Switch to 5mg Warfrin daily  Recheck INR on Tuesday  Apply a warm wet compress or soak in warm water with epsom salt for 10 minutes, 3-4 times a day to help area to heal.  Apply a thin layer of antibiotic ointment and cover with a bandage.    Monitor for signs of infection including fever, increasing pain, redness, warmth and/or swelling. If you have any of these or persistent symptoms that are not healing, follow up with your primary care provider.

## 2025-03-27 DIAGNOSIS — R45.86 BRIEF COMPENSATORY MOOD SWINGS: ICD-10-CM

## 2025-03-27 DIAGNOSIS — F41.9 ANXIETY: ICD-10-CM
